# Patient Record
Sex: MALE | Race: ASIAN | NOT HISPANIC OR LATINO | URBAN - METROPOLITAN AREA
[De-identification: names, ages, dates, MRNs, and addresses within clinical notes are randomized per-mention and may not be internally consistent; named-entity substitution may affect disease eponyms.]

---

## 2021-06-25 ENCOUNTER — INPATIENT (INPATIENT)
Facility: HOSPITAL | Age: 66
LOS: 11 days | Discharge: HOME HEALTH | DRG: 167 | End: 2021-07-07
Attending: INTERNAL MEDICINE | Admitting: INTERNAL MEDICINE
Payer: MEDICAID

## 2021-06-25 ENCOUNTER — TRANSCRIPTION ENCOUNTER (OUTPATIENT)
Age: 66
End: 2021-06-25

## 2021-06-25 VITALS
SYSTOLIC BLOOD PRESSURE: 146 MMHG | HEART RATE: 95 BPM | WEIGHT: 171.96 LBS | DIASTOLIC BLOOD PRESSURE: 99 MMHG | RESPIRATION RATE: 22 BRPM | HEIGHT: 62.99 IN | TEMPERATURE: 98 F | OXYGEN SATURATION: 97 %

## 2021-06-25 DIAGNOSIS — I10 ESSENTIAL (PRIMARY) HYPERTENSION: ICD-10-CM

## 2021-06-25 DIAGNOSIS — J90 PLEURAL EFFUSION, NOT ELSEWHERE CLASSIFIED: ICD-10-CM

## 2021-06-25 DIAGNOSIS — E78.5 HYPERLIPIDEMIA, UNSPECIFIED: ICD-10-CM

## 2021-06-25 DIAGNOSIS — Z29.9 ENCOUNTER FOR PROPHYLACTIC MEASURES, UNSPECIFIED: ICD-10-CM

## 2021-06-25 DIAGNOSIS — R06.00 DYSPNEA, UNSPECIFIED: ICD-10-CM

## 2021-06-25 LAB
ALBUMIN SERPL ELPH-MCNC: 2.7 G/DL — LOW (ref 3.5–5)
ALP SERPL-CCNC: 118 U/L — SIGNIFICANT CHANGE UP (ref 40–120)
ALT FLD-CCNC: 41 U/L DA — SIGNIFICANT CHANGE UP (ref 10–60)
ANION GAP SERPL CALC-SCNC: 8 MMOL/L — SIGNIFICANT CHANGE UP (ref 5–17)
APTT BLD: 32 SEC — SIGNIFICANT CHANGE UP (ref 27.5–35.5)
AST SERPL-CCNC: 23 U/L — SIGNIFICANT CHANGE UP (ref 10–40)
BASOPHILS # BLD AUTO: 0.06 K/UL — SIGNIFICANT CHANGE UP (ref 0–0.2)
BASOPHILS NFR BLD AUTO: 0.6 % — SIGNIFICANT CHANGE UP (ref 0–2)
BILIRUB SERPL-MCNC: 0.4 MG/DL — SIGNIFICANT CHANGE UP (ref 0.2–1.2)
BUN SERPL-MCNC: 10 MG/DL — SIGNIFICANT CHANGE UP (ref 7–18)
CALCIUM SERPL-MCNC: 8.5 MG/DL — SIGNIFICANT CHANGE UP (ref 8.4–10.5)
CHLORIDE SERPL-SCNC: 106 MMOL/L — SIGNIFICANT CHANGE UP (ref 96–108)
CO2 SERPL-SCNC: 23 MMOL/L — SIGNIFICANT CHANGE UP (ref 22–31)
CREAT SERPL-MCNC: 0.98 MG/DL — SIGNIFICANT CHANGE UP (ref 0.5–1.3)
EOSINOPHIL # BLD AUTO: 0.19 K/UL — SIGNIFICANT CHANGE UP (ref 0–0.5)
EOSINOPHIL NFR BLD AUTO: 2 % — SIGNIFICANT CHANGE UP (ref 0–6)
GLUCOSE SERPL-MCNC: 98 MG/DL — SIGNIFICANT CHANGE UP (ref 70–99)
HCT VFR BLD CALC: 43.8 % — SIGNIFICANT CHANGE UP (ref 39–50)
HGB BLD-MCNC: 14.6 G/DL — SIGNIFICANT CHANGE UP (ref 13–17)
IMM GRANULOCYTES NFR BLD AUTO: 0.4 % — SIGNIFICANT CHANGE UP (ref 0–1.5)
INR BLD: 1.33 RATIO — HIGH (ref 0.88–1.16)
LYMPHOCYTES # BLD AUTO: 0.68 K/UL — LOW (ref 1–3.3)
LYMPHOCYTES # BLD AUTO: 7.3 % — LOW (ref 13–44)
MCHC RBC-ENTMCNC: 30 PG — SIGNIFICANT CHANGE UP (ref 27–34)
MCHC RBC-ENTMCNC: 33.3 GM/DL — SIGNIFICANT CHANGE UP (ref 32–36)
MCV RBC AUTO: 89.9 FL — SIGNIFICANT CHANGE UP (ref 80–100)
MONOCYTES # BLD AUTO: 1.46 K/UL — HIGH (ref 0–0.9)
MONOCYTES NFR BLD AUTO: 15.6 % — HIGH (ref 2–14)
NEUTROPHILS # BLD AUTO: 6.91 K/UL — SIGNIFICANT CHANGE UP (ref 1.8–7.4)
NEUTROPHILS NFR BLD AUTO: 74.1 % — SIGNIFICANT CHANGE UP (ref 43–77)
NRBC # BLD: 0 /100 WBCS — SIGNIFICANT CHANGE UP (ref 0–0)
PLATELET # BLD AUTO: 453 K/UL — HIGH (ref 150–400)
POTASSIUM SERPL-MCNC: 4.6 MMOL/L — SIGNIFICANT CHANGE UP (ref 3.5–5.3)
POTASSIUM SERPL-SCNC: 4.6 MMOL/L — SIGNIFICANT CHANGE UP (ref 3.5–5.3)
PROT SERPL-MCNC: 7.6 G/DL — SIGNIFICANT CHANGE UP (ref 6–8.3)
PROTHROM AB SERPL-ACNC: 15.6 SEC — HIGH (ref 10.6–13.6)
RBC # BLD: 4.87 M/UL — SIGNIFICANT CHANGE UP (ref 4.2–5.8)
RBC # FLD: 13.4 % — SIGNIFICANT CHANGE UP (ref 10.3–14.5)
SARS-COV-2 RNA SPEC QL NAA+PROBE: SIGNIFICANT CHANGE UP
SODIUM SERPL-SCNC: 137 MMOL/L — SIGNIFICANT CHANGE UP (ref 135–145)
TROPONIN I SERPL-MCNC: <0.015 NG/ML — SIGNIFICANT CHANGE UP (ref 0–0.04)
WBC # BLD: 9.34 K/UL — SIGNIFICANT CHANGE UP (ref 3.8–10.5)
WBC # FLD AUTO: 9.34 K/UL — SIGNIFICANT CHANGE UP (ref 3.8–10.5)

## 2021-06-25 PROCEDURE — 99284 EMERGENCY DEPT VISIT MOD MDM: CPT

## 2021-06-25 PROCEDURE — G1004: CPT

## 2021-06-25 PROCEDURE — 93010 ELECTROCARDIOGRAM REPORT: CPT

## 2021-06-25 PROCEDURE — 71260 CT THORAX DX C+: CPT | Mod: 26,MG

## 2021-06-25 RX ORDER — AZITHROMYCIN 500 MG/1
500 TABLET, FILM COATED ORAL ONCE
Refills: 0 | Status: COMPLETED | OUTPATIENT
Start: 2021-06-25 | End: 2021-06-25

## 2021-06-25 RX ORDER — AZITHROMYCIN 500 MG/1
500 TABLET, FILM COATED ORAL EVERY 24 HOURS
Refills: 0 | Status: DISCONTINUED | OUTPATIENT
Start: 2021-06-26 | End: 2021-06-28

## 2021-06-25 RX ORDER — CEFTRIAXONE 500 MG/1
1000 INJECTION, POWDER, FOR SOLUTION INTRAMUSCULAR; INTRAVENOUS EVERY 24 HOURS
Refills: 0 | Status: DISCONTINUED | OUTPATIENT
Start: 2021-06-25 | End: 2021-06-28

## 2021-06-25 RX ORDER — AZITHROMYCIN 500 MG/1
TABLET, FILM COATED ORAL
Refills: 0 | Status: DISCONTINUED | OUTPATIENT
Start: 2021-06-25 | End: 2021-06-28

## 2021-06-25 RX ORDER — OXYCODONE AND ACETAMINOPHEN 5; 325 MG/1; MG/1
1 TABLET ORAL EVERY 8 HOURS
Refills: 0 | Status: DISCONTINUED | OUTPATIENT
Start: 2021-06-25 | End: 2021-06-28

## 2021-06-25 RX ORDER — ACETAMINOPHEN 500 MG
650 TABLET ORAL EVERY 6 HOURS
Refills: 0 | Status: DISCONTINUED | OUTPATIENT
Start: 2021-06-25 | End: 2021-06-28

## 2021-06-25 RX ORDER — TRAMADOL HYDROCHLORIDE 50 MG/1
50 TABLET ORAL EVERY 8 HOURS
Refills: 0 | Status: DISCONTINUED | OUTPATIENT
Start: 2021-06-25 | End: 2021-06-27

## 2021-06-25 RX ORDER — HEPARIN SODIUM 5000 [USP'U]/ML
5000 INJECTION INTRAVENOUS; SUBCUTANEOUS EVERY 8 HOURS
Refills: 0 | Status: DISCONTINUED | OUTPATIENT
Start: 2021-06-25 | End: 2021-06-27

## 2021-06-25 RX ORDER — ALBUTEROL 90 UG/1
2 AEROSOL, METERED ORAL EVERY 6 HOURS
Refills: 0 | Status: DISCONTINUED | OUTPATIENT
Start: 2021-06-25 | End: 2021-06-28

## 2021-06-25 RX ADMIN — HEPARIN SODIUM 5000 UNIT(S): 5000 INJECTION INTRAVENOUS; SUBCUTANEOUS at 22:01

## 2021-06-25 RX ADMIN — AZITHROMYCIN 500 MILLIGRAM(S): 500 TABLET, FILM COATED ORAL at 20:00

## 2021-06-25 RX ADMIN — CEFTRIAXONE 100 MILLIGRAM(S): 500 INJECTION, POWDER, FOR SOLUTION INTRAMUSCULAR; INTRAVENOUS at 19:58

## 2021-06-25 NOTE — DISCHARGE NOTE PROVIDER - PROVIDER TOKENS
PROVIDER:[TOKEN:[[id number]],FOLLOWUP:[2 weeks]] PROVIDER:[TOKEN:[[id number]],FOLLOWUP:[2 weeks]],PROVIDER:[TOKEN:[[id number]],FOLLOWUP:[2 weeks]],PROVIDER:[TOKEN:[[id number]],FOLLOWUP:[Routine]]

## 2021-06-25 NOTE — H&P ADULT - HISTORY OF PRESENT ILLNESS
65 y/o male with a PMH of HTN, HLD  presents to the ED with c/o SOB, cough and chest tightness for the past 2 weeks. Patient went to urgent care today and was referred to the ED for further evaluation. Pt states he started having flu like symptoms with a runny nose and low grade fever of 100.5-101 2 weeks ago. He then started developing a cough and body aches with chest tightness. The chest tightness is more on the left side, 7/10 in intensity and radiating to the back and stomach. He has been having night sweats and chills for the past 8 days. Cough is associated with yellow coloured phlegm. SOB is worse when lying down. Pt denies any known Tuberculosis exposure. Pt moved to the  in 2003 from [place]. He smokes 1pack in 4 days and has been smoking for 40 years.   Patient denies fever, nausea, vomiting, or any other complaints. NKDA

## 2021-06-25 NOTE — DISCHARGE NOTE PROVIDER - HOSPITAL COURSE
67 y/o male with a PMH of HTN, HLD , active smoker presents to the ED with c/o SOB, cough and chest tightness for the past 2 weeks.  CT: Large left pleural effusion with associated atelectasis of the left lung as described above. Associated right shift of the mediastinum.  Mildly enlarged subcarinal lymph node. Ascending aortic aneurysm measuring 4.1 cm in diameter. 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 Mild COPD.  Sclerotic lesion in the left posterior fifth rib. Small sclerotic lesion in T2 vertebra. Pt was started on start on Rocephin and Azithromycin empirically. QuantiFeron for TB was sent to lab. Pulmonologist, Dr Mendez & hem/Onc: Dr López was consulted.      >>>>>>>>>>>>>>>>>>>>>>>>>>>>>>>>>>>>>>>>>>>INCOMPLETE>>>>>>>>>>>>>>>>>>>>>>>>>>>>>>>>>>>>>>>>   67 y/o male with a PMH of HTN, HLD, smoker  presents to the ED with c/o SOB, cough and chest tightness for the past 2 weeks. Also reported  night sweats and chills for one week   CT Chest w/ IV Cont Large left pleural effusion with associated atelectasis of the left lung as described above. Associated right shift of the mediastinum. Mildly enlarged subcarinal lymph node.  Admitted for pleural effusion  r/o  malignancy and TB   Pulm, heme /onc and thoracic surgery were consulted.   Pt underwent Lt VATS with pleural biopsy and pleurx cath was inserted on 6.28. Frozen biopsy was performed of lesion that appeared concerning for malignancy, as per pathology suspicious of malignancy however tissue was to edematous and necrotic to say for certain.     Heme /onc consulted for large pleural effusion which highly suspicious for malignancy, tumor markers tested and CEA was elevated which concerning for lung cancer.   Pulm consulted, tested AFB to r/o TB,  quantiferon was negative, placed pt on airborne isolation, 1st AFB negative- [id number]    incomplete 6/30      67 y/o male with a PMH of HTN, HLD, smoker  presents to the ED with c/o SOB, cough and chest tightness for the past 2 weeks. Also reported  night sweats and chills for one week   CT Chest w/ IV Cont Large left pleural effusion with associated atelectasis of the left lung as described above. Associated right shift of the mediastinum. Mildly enlarged subcarinal lymph node.  Admitted for pleural effusion  r/o  malignancy and TB   Pulm, heme /onc and thoracic surgery were consulted.   Pt underwent Lt VATS with pleural biopsy and pleurx cath was inserted on 6.28. Frozen biopsy was performed of lesion that appeared concerning for malignancy, as per pathology suspicious of malignancy however tissue was to edematous and necrotic to say for certain.     Heme /onc consulted for large pleural effusion which highly suspicious for malignancy, tumor markers tested and CEA was elevated which concerning for lung cancer.   Pulm consulted, tested AFB to r/o TB,  quantiferon was negative, placed pt on airborne isolation,  AFB negative x 2 times 3rd sent 7/2     incomplete 6/30      67 y/o male with a PMH of HTN, HLD, smoker  presents to the ED with c/o SOB, cough and chest tightness for the past 2 weeks. Also reported  night sweats and chills for one week   CT Chest w/ IV Cont Large left pleural effusion with associated atelectasis of the left lung as described above. Associated right shift of the mediastinum. Mildly enlarged subcarinal lymph node.  Admitted for pleural effusion  r/o  malignancy and TB.  Pulm, heme /onc and thoracic surgery were consulted.   Pt underwent Lt VATS with pleural biopsy and pleurx cath was inserted on 6.28. Frozen biopsy was performed of lesion that appeared concerning for malignancy, as per pathology suspicious of malignancy however tissue was to edematous and necrotic to say for certain.  Heme /onc consulted for large pleural effusion which highly suspicious for malignancy, tumor markers tested and CEA was elevated which concerning for lung cancer.   Pulmonary followed, tested AFB to r/o TB which are negative x 3 (6/30, 7/1, 7/2),  quantiferon was negative, monitored pt on airborne isolation,  pt denies cough, night sweats, fever or chest pain. Saturating well on room air.  Reinforced the importance of smoking cessation. pt agrees to continue nicotine patch. Left side Pleurx catheter remains intact. Thoracic surgery recommended to set VNS for drain  three times per week at home.    Patient is medically optimized for discharge home with outpatient follow up, cleared by Thoracic surgery and pulmonary. Discussed with attending physician.   Please note this is a brief summary and refer to medical records/daily progress notes for completed hospital course.

## 2021-06-25 NOTE — DISCHARGE NOTE PROVIDER - NSDCMRMEDTOKEN_GEN_ALL_CORE_FT
albuterol 90 mcg/inh inhalation aerosol: 2 puff(s) inhaled every 6 hours, As Needed -Shortness of Breath and/or Wheezing - for shortness of breath and/or wheezing   amLODIPine 10 mg oral tablet: 1 tab(s) orally once a day  guaiFENesin 100 mg/5 mL oral liquid: 5 milliliter(s) orally every 6 hours, As needed, Cough  nicotine 14 mg/24 hr transdermal film, extended release: 1 patch transdermal once a day   Plavix 75 mg oral tablet: 1 tab(s) orally once a day  Zocor 20 mg oral tablet: 1 tab(s) orally once a day (at bedtime)

## 2021-06-25 NOTE — ED PROVIDER NOTE - OBJECTIVE STATEMENT
mandarin  ID: [id number].   66 y.o male with a history of HLD and being an active smoker (1x pack in last 4 days) presents to the ED with c/o SOB and cough for the past 4 days. Patient went to urgent care today and was referred to the ED for further evaluation. Patient in the ED is afebrile. Patient denies fever, nausea, vomiting, or any other complaints. NKDA

## 2021-06-25 NOTE — H&P ADULT - NSHPSOCIALHISTORY_GEN_ALL_CORE
Fax received from [employer] requesting completion of refill/reorder form for patients insulin.  There is no product or dose on the form.  Call to pt for clarification regarding her request  Her last refill in the chart is noted from 8/20/2020 as Lispro/Humalog.  Left message in detail asking for a return call for clarification.  Message also sent in the portal.  Form is placed in folder for signature.           No ETOH. Smokes 1 pack in 4 days. Smoking for 40 years

## 2021-06-25 NOTE — DISCHARGE NOTE PROVIDER - NSDCFUADDINST_GEN_ALL_CORE_FT
Drain Pleurx catheter three times per day.   Do not drain over 300ml each time.  Drain Pleurx catheter three times per week.  Do not drain over 1L each time.

## 2021-06-25 NOTE — DISCHARGE NOTE PROVIDER - CARE PROVIDERS DIRECT ADDRESSES
,DirectAddress_Unknown ,DirectAddress_Unknown,[email or web address],DirectAddress_Unknown

## 2021-06-25 NOTE — H&P ADULT - PROBLEM SELECTOR PLAN 1
Pt coming in with dyspnea, pleuritic chest pain and cough  Active smoker  Decreased breath sounds on left side  Malignancy vs PNA   CT: Large left pleural effusion with associated atelectasis of the left lung as described above. Associated right shift of the mediastinum. Sclerotic lesion in the left posterior fifth rib. Small sclerotic lesion in T2 vertebra.  Will start on Rocephin and Azithro empirically   Pt will likely need a thoracentesis   Dr. Mendez consulted by ED  Dr. López consulted Pt coming in with dyspnea, pleuritic chest pain and cough  Active smoker  Decreased breath sounds on left side  Malignancy vs PNA   CT: Large left pleural effusion with associated atelectasis of the left lung as described above. Associated right shift of the mediastinum. Sclerotic lesion in the left posterior fifth rib. Small sclerotic lesion in T2 vertebra.  Will start on Rocephin and Azithro empirically   Pt will likely need a thoracentesis   Follow QuantiFeron for TB   Dr. Mendez consulted by ED  Dr. López consulted

## 2021-06-25 NOTE — DISCHARGE NOTE PROVIDER - NSFOLLOWUPCLINICS_GEN_ALL_ED_FT
[facility]  Pulmonary Medicine  [address] [place] [address]  Phone: [phone]  Fax: [phone]  Follow Up Time: 1 week

## 2021-06-25 NOTE — H&P ADULT - NSHPPHYSICALEXAM_GEN_ALL_CORE
LOS:     VITALS:   T(C): 36.4 (06-25-21 @ 16:32), Max: 36.5 (06-25-21 @ 11:27)  HR: 101 (06-25-21 @ 16:32) (95 - 101)  BP: 153/99 (06-25-21 @ 16:32) (146/99 - 153/99)  RR: 16 (06-25-21 @ 16:32) (16 - 22)  SpO2: 99% (06-25-21 @ 16:32) (97% - 99%)    GENERAL: sitting in bed comfortably   HEAD:  Atraumatic, Normocephalic  EYES: EOMI, PERRLA, Right eye pterygium;  ENT: Moist mucous membranes  NECK: Supple, No JVD  CHEST/LUNG: decreased breath sound on the left   HEART: Regular rate and rhythm; No murmurs, rubs, or gallops  ABDOMEN : Mild distension, BSx4; Soft, nontender  EXTREMITIES:  2+ Peripheral Pulses, brisk capillary refill. No clubbing, cyanosis, or edema  NERVOUS SYSTEM:  A&Ox3, no focal deficits   SKIN: No rashes or lesions

## 2021-06-25 NOTE — ED PROVIDER NOTE - CLINICAL SUMMARY MEDICAL DECISION MAKING FREE TEXT BOX
66 y.o male presents with SOB that has been worsening in the past 4 days. Will do labs, CT chest, and reassess.

## 2021-06-25 NOTE — H&P ADULT - NSHPREVIEWOFSYSTEMS_GEN_ALL_CORE
REVIEW OF SYSTEMS:    CONSTITUTIONAL: weakness and chills  EYES/ENT:   No vertigo or throat pain   NECK: No pain or stiffness  RESPIRATORY: shortness of breath and cough  CARDIOVASCULAR: chest pain, no palpitations  GASTROINTESTINAL: left sided abdominal pain. No nausea, vomiting, or hematemesis; No diarrhea or constipation. No melena or hematochezia.  GENITOURINARY: No dysuria, frequency or hematuria  NEUROLOGICAL: No numbness or weakness  SKIN: No itching, rashes

## 2021-06-25 NOTE — ED PROVIDER NOTE - PROGRESS NOTE DETAILS
MAVIS: patient signed out to me by Dr. Epps. SOB, cough. Awaiting results of CT chest. Plan is to admit. Patient is mildly tachypneic, though satting well on room air and VS stable. I endorsed patient to Dr. Spicer, who requested Andrea for pulmonary. I spoke to Dr. Mendez, described clinical situation and mediastinal shift. He will come see patient.

## 2021-06-25 NOTE — DISCHARGE NOTE PROVIDER - DETAILS OF MALNUTRITION DIAGNOSIS/DIAGNOSES
This patient has been assessed with a concern for Malnutrition and was treated during this hospitalization for the following Nutrition diagnosis/diagnoses:     -  07/02/2021: Moderate protein-calorie malnutrition

## 2021-06-25 NOTE — H&P ADULT - PROBLEM SELECTOR PLAN 4
IMPROVE VTE Individual Risk Assessment  RISK                                                                Points  [  ] Previous VTE                                                  3  [  ] Thrombophilia                                               2  [  ] Lower limb paralysis                                      2        (unable to hold up >15 seconds)    [  ] Current Cancer                                              2         (within 6 months)  [x  ] Immobilization > 24 hrs                                1  [  ] ICU/CCU stay > 24 hours                              1  [x  ] Age > 60                                                      1  IMPROVE VTE Score _________2, -- for DVT proph    Will start on heparin SubQ

## 2021-06-25 NOTE — H&P ADULT - ASSESSMENT
65 y/o male with a PMH of HTN, HLD  presents to the ED with c/o SOB, cough and chest tightness for the past 2 weeks.    CT: Large left pleural effusion with associated atelectasis of the left lung as described above. Associated right shift of the mediastinum.    Mildly enlarged subcarinal lymph node.    Ascending aortic aneurysm measuring 4.1 cm in diameter. 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    Mild COPD.    Sclerotic lesion in the left posterior fifth rib. Small sclerotic lesion in T2 vertebra. If clinically indicated, bone scan may be pursued for further evaluation.

## 2021-06-25 NOTE — CONSULT NOTE ADULT - SUBJECTIVE AND OBJECTIVE BOX
Time of visit:    CHIEF COMPLAINT: Patient is a 66y old  Male who presents with a chief complaint of Cough and Dyspnea (25 Jun 2021 18:41)      HPI:  67 y/o male with a PMH of HTN, HLD  presents to the ED with c/o SOB, cough and chest tightness for the past 2 weeks. Patient went to urgent care today and was referred to the ED for further evaluation. Pt states he started having flu like symptoms with a runny nose and low grade fever of 100.5-101 2 weeks ago. He then started developing a cough and body aches with chest tightness. The chest tightness is more on the left side, 7/10 in intensity and radiating to the back and stomach. He has been having night sweats and chills for the past 8 days. Cough is associated with yellow coloured phlegm. SOB is worse when lying down. Pt denies any known Tuberculosis exposure. Pt moved to the  in 2003 from [place]. He smokes 1pack in 4 days and has been smoking for 40 years.   Patient denies fever, nausea, vomiting, or any other complaints. NKDA (25 Jun 2021 18:41)   Patient seen and examined.     PAST MEDICAL & SURGICAL HISTORY:  HLD (hyperlipidemia)    HTN (hypertension)        Allergies    No Known Allergies    Intolerances        MEDICATIONS  (STANDING):  azithromycin  IVPB      cefTRIAXone   IVPB 1000 milliGRAM(s) IV Intermittent every 24 hours  heparin   Injectable 5000 Unit(s) SubCutaneous every 8 hours      MEDICATIONS  (PRN):  acetaminophen    Suspension .. 650 milliGRAM(s) Oral every 6 hours PRN Mild Pain (1 - 3)  ALBUTerol    90 MICROgram(s) HFA Inhaler 2 Puff(s) Inhalation every 6 hours PRN Shortness of Breath and/or Wheezing  oxycodone    5 mG/acetaminophen 325 mG 1 Tablet(s) Oral every 8 hours PRN Moderate Pain (4 - 6)  traMADol 50 milliGRAM(s) Oral every 8 hours PRN Severe Pain (7 - 10)   Medications up to date at time of exam.    Medications up to date at time of exam.    FAMILY HISTORY:      SOCIAL HISTORY  Smoking History: [   ] smoking/smoke exposure, [   ] former smoker  Living Condition: [   ] apartment, [   ] private house  Work History:   Travel History: denies recent travel  Illicit Substance Use: denies  Alcohol Use: denies    REVIEW OF SYSTEMS:    CONSTITUTIONAL:  denies fevers, chills, sweats, weight loss    HEENT:  denies diplopia or blurred vision, sore throat or runny nose.    CARDIOVASCULAR:  denies pressure, squeezing, tightness, or heaviness about the chest; no palpitations.    RESPIRATORY:  denies SOB, cough, OSBORN, wheezing.    GASTROINTESTINAL:  denies abdominal pain, nausea, vomiting or diarrhea.    GENITOURINARY: denies dysuria, frequency or urgency.    NEUROLOGIC:  denies numbness, tingling, seizures or weakness.    PSYCHIATRIC:  denies disorder of thought or mood.    MSK: denies swelling, redness      PHYSICAL EXAMINATION:    GENERAL: The patient is a well-developed, well-nourished, in no apparent distress.     Vital Signs Last 24 Hrs  T(C): 36.4 (25 Jun 2021 16:32), Max: 36.5 (25 Jun 2021 11:27)  T(F): 97.6 (25 Jun 2021 16:32), Max: 97.7 (25 Jun 2021 11:27)  HR: 101 (25 Jun 2021 16:32) (95 - 101)  BP: 153/99 (25 Jun 2021 16:32) (146/99 - 153/99)  BP(mean): --  RR: 16 (25 Jun 2021 16:32) (16 - 22)  SpO2: 99% (25 Jun 2021 16:32) (97% - 99%)   (if applicable)    Chest Tube (if applicable)    HEENT: Head is normocephalic and atraumatic. Extraocular muscles are intact. Mucous membranes are moist.     NECK: Supple, no palpable adenopathy.    LUNGS: Clear to auscultation, no wheezing, rales, or rhonchi.    HEART: Regular rate and rhythm without murmur.    ABDOMEN: Soft, nontender, and nondistended.  No hepatosplenomegaly is noted.    RENAL: No difficulty voiding, no pelvic pain    EXTREMITIES: Without any cyanosis, clubbing, rash, lesions or edema.    NEUROLOGIC: Awake, alert, oriented, grossly intact    SKIN: Warm, dry, good turgor.      LABS:                        14.6   9.34  )-----------( 453      ( 25 Jun 2021 13:56 )             43.8     06-25    137  |  106  |  10  ----------------------------<  98  4.6   |  23  |  0.98    Ca    8.5      25 Jun 2021 13:56    TPro  7.6  /  Alb  2.7<L>  /  TBili  0.4  /  DBili  x   /  AST  23  /  ALT  41  /  AlkPhos  118  06-25          CARDIAC MARKERS ( 25 Jun 2021 13:56 )  <0.015 ng/mL / x     / x     / x     / x                    MICROBIOLOGY: (if applicable)    RADIOLOGY & ADDITIONAL STUDIES:  EKG:   CXR:  ECHO:    IMPRESSION: 66y Male PAST MEDICAL & SURGICAL HISTORY:  HLD (hyperlipidemia)    HTN (hypertension)     p/w                   RECOMMENDATIONS:   Time of visit:    CHIEF COMPLAINT: Patient is a 66y old  Male who presents with a chief complaint of Cough and Dyspnea (25 Jun 2021 18:41)      HPI:  67 y/o male with a PMH of HTN, HLD  presents to the ED with c/o SOB, cough and chest tightness for the past 2 weeks. Patient went to urgent care today and was referred to the ED for further evaluation. Pt states he started having flu like symptoms with a runny nose and low grade fever of 100.5-101 2 weeks ago. He then started developing a cough and body aches with chest tightness. The chest tightness is more on the left side, 7/10 in intensity and radiating to the back and stomach. He has been having night sweats and chills for the past 8 days. Cough is associated with yellow coloured phlegm. SOB is worse when lying down. Pt denies any known Tuberculosis exposure. Pt moved to the  in 2003 from [place]. He smokes 1pack in 4 days and has been smoking for 40 years.   Patient denies fever, nausea, vomiting, or any other complaints. NKDA (25 Jun 2021 18:41)   Patient seen and examined.     PAST MEDICAL & SURGICAL HISTORY:  HLD (hyperlipidemia)    HTN (hypertension)        Allergies    No Known Allergies    Intolerances        MEDICATIONS  (STANDING):  azithromycin  IVPB      cefTRIAXone   IVPB 1000 milliGRAM(s) IV Intermittent every 24 hours  heparin   Injectable 5000 Unit(s) SubCutaneous every 8 hours      MEDICATIONS  (PRN):  acetaminophen    Suspension .. 650 milliGRAM(s) Oral every 6 hours PRN Mild Pain (1 - 3)  ALBUTerol    90 MICROgram(s) HFA Inhaler 2 Puff(s) Inhalation every 6 hours PRN Shortness of Breath and/or Wheezing  oxycodone    5 mG/acetaminophen 325 mG 1 Tablet(s) Oral every 8 hours PRN Moderate Pain (4 - 6)  traMADol 50 milliGRAM(s) Oral every 8 hours PRN Severe Pain (7 - 10)   Medications up to date at time of exam.    Medications up to date at time of exam.    FAMILY HISTORY:      SOCIAL HISTORY  Smoking History: [ x  ] smoking/smoke exposure, 1/2 PPD  Living Condition: [   ] apartment, [   ] private house  Work History:   Travel History: denies recent travel  Illicit Substance Use: denies  Alcohol Use: denies    REVIEW OF SYSTEMS:    CONSTITUTIONAL:  denies fevers, chills, sweats, weight loss    HEENT:  denies diplopia or blurred vision, sore throat or runny nose.    CARDIOVASCULAR:  denies pressure, squeezing, tightness, or heaviness about the chest; no palpitations.    RESPIRATORY:  denies SOB, cough, OSBORN, wheezing.    GASTROINTESTINAL:  denies abdominal pain, nausea, vomiting or diarrhea.    GENITOURINARY: denies dysuria, frequency or urgency.    NEUROLOGIC:  denies numbness, tingling, seizures or weakness.    PSYCHIATRIC:  denies disorder of thought or mood.    MSK: denies swelling, redness      PHYSICAL EXAMINATION:    GENERAL: The patient is a well-developed, well-nourished, in no apparent distress.     Vital Signs Last 24 Hrs  T(C): 36.4 (25 Jun 2021 16:32), Max: 36.5 (25 Jun 2021 11:27)  T(F): 97.6 (25 Jun 2021 16:32), Max: 97.7 (25 Jun 2021 11:27)  HR: 101 (25 Jun 2021 16:32) (95 - 101)  BP: 153/99 (25 Jun 2021 16:32) (146/99 - 153/99)  BP(mean): --  RR: 16 (25 Jun 2021 16:32) (16 - 22)  SpO2: 99% (25 Jun 2021 16:32) (97% - 99%)   (if applicable)    Chest Tube (if applicable)    HEENT: Head is normocephalic and atraumatic. Extraocular muscles are intact. Mucous membranes are moist.     NECK: Supple, no palpable adenopathy.    LUNGS: Left hemithorax dullness , fair right breath sounds     HEART: Regular rate and rhythm without murmur.    ABDOMEN: Soft, nontender, and nondistended.  No hepatosplenomegaly is noted.    RENAL: No difficulty voiding, no pelvic pain    EXTREMITIES: Without any cyanosis, clubbing, rash, lesions or edema.    NEUROLOGIC: Awake, alert, oriented, grossly intact    SKIN: Warm, dry, good turgor.      LABS:                        14.6   9.34  )-----------( 453      ( 25 Jun 2021 13:56 )             43.8     06-25    137  |  106  |  10  ----------------------------<  98  4.6   |  23  |  0.98    Ca    8.5      25 Jun 2021 13:56    TPro  7.6  /  Alb  2.7<L>  /  TBili  0.4  /  DBili  x   /  AST  23  /  ALT  41  /  AlkPhos  118  06-25          CARDIAC MARKERS ( 25 Jun 2021 13:56 )  <0.015 ng/mL / x     / x     / x     / x                    MICROBIOLOGY: (if applicable)    RADIOLOGY & ADDITIONAL STUDIES:  EKG:   CTPA:< from: CT Chest w/ IV Cont (06.25.21 @ 15:03) >    EXAM:  CT CHEST IC                            PROCEDURE DATE:  06/25/2021          INTERPRETATION:  CLINICAL INFORMATION: Abnormal chest x-ray. Large left pleural effusion.    COMPARISON: None.    CONTRAST/COMPLICATIONS:  IV Contrast: Omnipaque 350 40 cc administered   60 cc discarded  Oral Contrast: NONE  Complications: None reported at time of study completion    PROCEDURE:  CT of the Chest was performed.  Sagittal and coronal reformats were performed.    FINDINGS: Respiratory motion artifact limits evaluation.    LUNGS AND AIRWAYS: Patent central airways.  Small calcified granuloma in the right lung. Near-complete compressive atelectasis of the left lower lobe. Partial compressive atelectasis of the left upper lobe. Complete compressiveatelectasis of the lingula. Mild emphysematous changes in both lungs, compatible with COPD.  PLEURA: Large left pleural effusion. No evidence for pneumothorax.  MEDIASTINUM AND AKIRA: Mildly enlarged 1.1 cm subcarinal lymph node. Right shift of the mediastinum due to the large left pleural effusion.  VESSELS: Ascending aortic aneurysm measuring 4.1 cm in diameter. 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 No evidence for aortic dissection. Small aortic calcifications.  HEART: Heart size is normal. No pericardial effusion.  CHEST WALL AND LOWER NECK: Within normal limits.  VISUALIZED UPPER ABDOMEN: Small hypodense lesions in the liver, too small to characterize. Colonic diverticulosis.  BONES: Sclerotic lesion in the left posterior fifth rib. Small sclerotic lesion in T2 vertebra. If clinically indicated, bone scan may be pursued for further evaluation.    IMPRESSION:  Large left pleural effusion with associated atelectasis of the left lung as described above. Associated rightshift of the mediastinum.    Mildly enlarged subcarinal lymph node.    Ascending aortic aneurysm measuring 4.1 cm in diameter. 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    Mild COPD.    Sclerotic lesion in the left posterior fifth rib. Small sclerotic lesion in T2 vertebra. If clinically indicated, bone scan may be pursued for further evaluation.            MANDO HERNANDEZ MD; Attending Radiologist  This document has been electronically signed. Jun 25 2021  3:35PM    < end of copied text >    ECHO:    IMPRESSION: 66y Male PAST MEDICAL & SURGICAL HISTORY:  HLD (hyperlipidemia)    HTN (hypertension)     p/w           IMP: This is a66 yr old  man , active   smoker , HTN, HLD presented with dyspnea and cough due to massive left pleural effusion completely  filling  left hemithorax with out contralateral mediastinal shift . There is suspicion of underlying mass.  Pat is not in resp distress .       Plan     -air borne isolation   -sputum for AFB x 3  -O2 supp to maintain sat >90%  -Thoracic eval for VATS   -advise to stop smoking   -nicotine patch   -dvt/gi prophy  -PSA, CEA  dvt/ gi prophy

## 2021-06-25 NOTE — DISCHARGE NOTE PROVIDER - NSDCCPCAREPLAN_GEN_ALL_CORE_FT
PRINCIPAL DISCHARGE DIAGNOSIS  Diagnosis: Pleural effusion  Assessment and Plan of Treatment:       SECONDARY DISCHARGE DIAGNOSES  Diagnosis: Dyspnea, unspecified type  Assessment and Plan of Treatment:      PRINCIPAL DISCHARGE DIAGNOSIS  Diagnosis: Pleural effusion  Assessment and Plan of Treatment: You presented with dyspnea nad cough and found to have fluis in your lung  Thoracic surgeon followed you and you underwent Left  chest tube, sent biopsy for pleural area   biopsy results showed -xxxxxxxxxxxxxxxxxx  follow up with hematology/ oncology as out=patient and pulmonology         SECONDARY DISCHARGE DIAGNOSES  Diagnosis: Dyspnea, unspecified type  Assessment and Plan of Treatment:      PRINCIPAL DISCHARGE DIAGNOSIS  Diagnosis: Pleural effusion  Assessment and Plan of Treatment: You presented with dyspnea nad cough and found to have fluis in your lung  Thoracic surgeon followed you and you underwent Left  chest tube, sent biopsy for pleural area   biopsy results showed -xxxxxxxxxxxxxxxxxx  follow up with hematology/ oncology as out=patient and pulmonology         SECONDARY DISCHARGE DIAGNOSES  Diagnosis: HTN (hypertension)  Assessment and Plan of Treatment: Low salt diet  Activity as tolerated.  Take all medication as prescribed.  Follow up with your medical doctor for routine blood pressure monitoring at your next visit.  Notify your doctor if you have any of the following symptoms:   Dizziness, Lightheadedness, Blurry vision, Headache, Chest pain, Shortness of breath       PRINCIPAL DISCHARGE DIAGNOSIS  Diagnosis: Pleural effusion  Assessment and Plan of Treatment: You presented with dyspnea and cough and found to have fluids in your lung. CT Chest w/ IV Cont Large left pleural effusion with associated atelectasis of the left lung as described above. Associated rightshift of the mediastinum. Mildly enlarged subcarinal lymph node. treated wtih antibiotic. Heme/oncology, Pulmonary doctor and Thoracic surgeon followed.   You underwent left VATS with pleurx catheter (6/28) then changed to wall suction on same day due to  Left pneumothorax  largely filled in with fluid from CXR. Repeat CXR shows no pneumothorax.   cytology from 6/28 is negative for malignant,  sent biopsy for pleural area. blood TB quantiferon test is negative, sputum cultures are negative for TB. repeat Chest Xray shows no significant change of fluid effusion. continue activity as tolerated. use incentive spirometer.   Chest tube with wall suction was changed to pleurx catheter with cap.   Catheter needs to be drained three times per week with visiting nurse service. Do not drain more than 1L as per thoracic surgery team.   follow up with hematology/ oncology dr. López. Pulmnary dr. Anderson (at [address] [place] [address]), Thoracic surgery dr. Jarrett within 1-2 weeks after discharge. follow up for finalized pathology report.   take cough medicine OTC for cough, albuterol inhaler as needed when you feel shortness of breath, wheezing.   Report to your doctor if you have chest pain, shortness of breath, fever, night sweats or cough, etc.      SECONDARY DISCHARGE DIAGNOSES  Diagnosis: Smoking  Assessment and Plan of Treatment: continue nicotine patch as recommended. Consider smoking cessation.    Diagnosis: HTN (hypertension)  Assessment and Plan of Treatment: Low salt diet  Activity as tolerated.  Take all medication as prescribed.  Follow up with your medical doctor for routine blood pressure monitoring at your next visit.  Notify your doctor if you have any of the following symptoms:   Dizziness, Lightheadedness, Blurry vision, Headache, Chest pain, Shortness of breath      Diagnosis: HLD (hyperlipidemia)  Assessment and Plan of Treatment: Follow up with PCP for treatment goals, continue medication, have liver function testing every 3 months as anti lipid medications can cause liver irritation, eat low fat, low cholesterol meals

## 2021-06-25 NOTE — DISCHARGE NOTE PROVIDER - CARE PROVIDER_API CALL
Justin López  INTERNAL MEDICINE  [address] [place] [address]  Phone: [phone]  Fax: [phone]  Follow Up Time: 2 weeks   Justin López  INTERNAL MEDICINE  [address]  [place] [id number]  Phone: [phone]  Fax: [phone]  Follow Up Time: 2 weeks    Lauryn Jarrett)  Surgery; Thoracic Surgery  [address] [place] [address]  Phone: [phone]  Fax: [phone]  Follow Up Time: 2 weeks    PRANAV WILLIAMSON  Internal Medicine  [address] [place] [address]  Phone: [phone]  Fax: [phone]  Follow Up Time: Routine

## 2021-06-26 LAB
24R-OH-CALCIDIOL SERPL-MCNC: 25.6 NG/ML — LOW (ref 30–80)
ANION GAP SERPL CALC-SCNC: 8 MMOL/L — SIGNIFICANT CHANGE UP (ref 5–17)
BUN SERPL-MCNC: 11 MG/DL — SIGNIFICANT CHANGE UP (ref 7–18)
CALCIUM SERPL-MCNC: 8.2 MG/DL — LOW (ref 8.4–10.5)
CEA SERPL-MCNC: 1.9 NG/ML — SIGNIFICANT CHANGE UP (ref 0–3.8)
CHLORIDE SERPL-SCNC: 105 MMOL/L — SIGNIFICANT CHANGE UP (ref 96–108)
CHOLEST SERPL-MCNC: 173 MG/DL — SIGNIFICANT CHANGE UP
CO2 SERPL-SCNC: 24 MMOL/L — SIGNIFICANT CHANGE UP (ref 22–31)
COVID-19 SPIKE DOMAIN AB INTERP: POSITIVE
COVID-19 SPIKE DOMAIN ANTIBODY RESULT: >250 U/ML — HIGH
CREAT SERPL-MCNC: 0.82 MG/DL — SIGNIFICANT CHANGE UP (ref 0.5–1.3)
GLUCOSE SERPL-MCNC: 87 MG/DL — SIGNIFICANT CHANGE UP (ref 70–99)
HCT VFR BLD CALC: 40.3 % — SIGNIFICANT CHANGE UP (ref 39–50)
HCV AB S/CO SERPL IA: 0.15 S/CO — SIGNIFICANT CHANGE UP (ref 0–0.99)
HCV AB SERPL-IMP: SIGNIFICANT CHANGE UP
HDLC SERPL-MCNC: 29 MG/DL — LOW
HGB BLD-MCNC: 13.6 G/DL — SIGNIFICANT CHANGE UP (ref 13–17)
LIPID PNL WITH DIRECT LDL SERPL: 127 MG/DL — HIGH
MAGNESIUM SERPL-MCNC: 2.5 MG/DL — SIGNIFICANT CHANGE UP (ref 1.6–2.6)
MCHC RBC-ENTMCNC: 30 PG — SIGNIFICANT CHANGE UP (ref 27–34)
MCHC RBC-ENTMCNC: 33.7 GM/DL — SIGNIFICANT CHANGE UP (ref 32–36)
MCV RBC AUTO: 88.8 FL — SIGNIFICANT CHANGE UP (ref 80–100)
NON HDL CHOLESTEROL: 144 MG/DL — HIGH
NRBC # BLD: 0 /100 WBCS — SIGNIFICANT CHANGE UP (ref 0–0)
PHOSPHATE SERPL-MCNC: 2.7 MG/DL — SIGNIFICANT CHANGE UP (ref 2.5–4.5)
PLATELET # BLD AUTO: 233 K/UL — SIGNIFICANT CHANGE UP (ref 150–400)
POTASSIUM SERPL-MCNC: 3.9 MMOL/L — SIGNIFICANT CHANGE UP (ref 3.5–5.3)
POTASSIUM SERPL-SCNC: 3.9 MMOL/L — SIGNIFICANT CHANGE UP (ref 3.5–5.3)
PSA FLD-MCNC: 5.1 NG/ML — HIGH (ref 0–4)
RBC # BLD: 4.54 M/UL — SIGNIFICANT CHANGE UP (ref 4.2–5.8)
RBC # FLD: 13.3 % — SIGNIFICANT CHANGE UP (ref 10.3–14.5)
SARS-COV-2 IGG+IGM SERPL QL IA: >250 U/ML — HIGH
SARS-COV-2 IGG+IGM SERPL QL IA: POSITIVE
SODIUM SERPL-SCNC: 137 MMOL/L — SIGNIFICANT CHANGE UP (ref 135–145)
TRIGL SERPL-MCNC: 85 MG/DL — SIGNIFICANT CHANGE UP
TSH SERPL-MCNC: 0.68 UU/ML — SIGNIFICANT CHANGE UP (ref 0.34–4.82)
WBC # BLD: 9.39 K/UL — SIGNIFICANT CHANGE UP (ref 3.8–10.5)
WBC # FLD AUTO: 9.39 K/UL — SIGNIFICANT CHANGE UP (ref 3.8–10.5)

## 2021-06-26 RX ADMIN — Medication 650 MILLIGRAM(S): at 02:10

## 2021-06-26 RX ADMIN — AZITHROMYCIN 255 MILLIGRAM(S): 500 TABLET, FILM COATED ORAL at 20:04

## 2021-06-26 RX ADMIN — HEPARIN SODIUM 5000 UNIT(S): 5000 INJECTION INTRAVENOUS; SUBCUTANEOUS at 21:08

## 2021-06-26 RX ADMIN — Medication 650 MILLIGRAM(S): at 03:10

## 2021-06-26 RX ADMIN — ALBUTEROL 2 PUFF(S): 90 AEROSOL, METERED ORAL at 21:09

## 2021-06-26 RX ADMIN — HEPARIN SODIUM 5000 UNIT(S): 5000 INJECTION INTRAVENOUS; SUBCUTANEOUS at 06:15

## 2021-06-26 RX ADMIN — CEFTRIAXONE 100 MILLIGRAM(S): 500 INJECTION, POWDER, FOR SOLUTION INTRAMUSCULAR; INTRAVENOUS at 19:03

## 2021-06-26 RX ADMIN — TRAMADOL HYDROCHLORIDE 50 MILLIGRAM(S): 50 TABLET ORAL at 04:21

## 2021-06-26 RX ADMIN — HEPARIN SODIUM 5000 UNIT(S): 5000 INJECTION INTRAVENOUS; SUBCUTANEOUS at 14:44

## 2021-06-26 RX ADMIN — TRAMADOL HYDROCHLORIDE 50 MILLIGRAM(S): 50 TABLET ORAL at 03:13

## 2021-06-26 NOTE — PROGRESS NOTE ADULT - SUBJECTIVE AND OBJECTIVE BOX
SUBJECTIVE / OVERNIGHT EVENTS: pt seen and examined      MEDICATIONS  (STANDING):  azithromycin  IVPB      azithromycin  IVPB 500 milliGRAM(s) IV Intermittent every 24 hours  cefTRIAXone   IVPB 1000 milliGRAM(s) IV Intermittent every 24 hours  heparin   Injectable 5000 Unit(s) SubCutaneous every 8 hours    MEDICATIONS  (PRN):  acetaminophen    Suspension .. 650 milliGRAM(s) Oral every 6 hours PRN Mild Pain (1 - 3)  ALBUTerol    90 MICROgram(s) HFA Inhaler 2 Puff(s) Inhalation every 6 hours PRN Shortness of Breath and/or Wheezing  oxycodone    5 mG/acetaminophen 325 mG 1 Tablet(s) Oral every 8 hours PRN Moderate Pain (4 - 6)  traMADol 50 milliGRAM(s) Oral every 8 hours PRN Severe Pain (7 - 10)        CAPILLARY BLOOD GLUCOSE      Vital Signs Last 24 Hrs  T(C): 36.4 (26 Jun 2021 20:54), Max: 36.7 (26 Jun 2021 14:28)  T(F): 97.6 (26 Jun 2021 20:54), Max: 98.1 (26 Jun 2021 14:28)  HR: 90 (26 Jun 2021 20:54) (77 - 90)  BP: 130/89 (26 Jun 2021 20:54) (130/89 - 146/93)  BP(mean): --  RR: 18 (26 Jun 2021 20:54) (18 - 22)  SpO2: 98% (26 Jun 2021 20:54) (96% - 98%)  I&O's Summary      Constitutional: No fever, fatigue  Skin: No rash.  Eyes: No recent vision problems or eye pain.  ENT: No congestion, ear pain, or sore throat.  Cardiovascular: No chest pain or palpation.  Respiratory: No cough, shortness of breath, congestion, or wheezing.  Gastrointestinal: No abdominal pain, nausea, vomiting, or diarrhea.  Genitourinary: No dysuria.  Musculoskeletal: No joint swelling.  Neurologic: No headache.    PHYSICAL EXAM:  GENERAL: NAD  EYES: EOMI, PERRLA  NECK: Supple, No JVD  CHEST/LUNG: dec breath sounds left side , no wheezing  HEART:  S1 , S2 +  ABDOMEN: soft , bs+  EXTREMITIES:  no edema  NEUROLOGY:alert awake oriented       LABS:                        13.6   9.39  )-----------( 233      ( 26 Jun 2021 08:39 )             40.3     06-26    137  |  105  |  11  ----------------------------<  87  3.9   |  24  |  0.82    Ca    8.2<L>      26 Jun 2021 08:39  Phos  2.7     06-26  Mg     2.5     06-26    TPro  7.6  /  Alb  2.7<L>  /  TBili  0.4  /  DBili  x   /  AST  23  /  ALT  41  /  AlkPhos  118  06-25    PT/INR - ( 25 Jun 2021 20:56 )   PT: 15.6 sec;   INR: 1.33 ratio         PTT - ( 25 Jun 2021 20:56 )  PTT:32.0 sec  CARDIAC MARKERS ( 25 Jun 2021 13:56 )  <0.015 ng/mL / x     / x     / x     / x              RADIOLOGY & ADDITIONAL TESTS:    Imaging Personally Reviewed:    Consultant(s) Notes Reviewed:      Care Discussed with Consultants/Other Providers:

## 2021-06-26 NOTE — PROGRESS NOTE ADULT - SUBJECTIVE AND OBJECTIVE BOX
Time of Visit:  Patient seen and examined. pat is ambulation w=in room     MEDICATIONS  (STANDING):  azithromycin  IVPB      azithromycin  IVPB 500 milliGRAM(s) IV Intermittent every 24 hours  cefTRIAXone   IVPB 1000 milliGRAM(s) IV Intermittent every 24 hours  heparin   Injectable 5000 Unit(s) SubCutaneous every 8 hours      MEDICATIONS  (PRN):  acetaminophen    Suspension .. 650 milliGRAM(s) Oral every 6 hours PRN Mild Pain (1 - 3)  ALBUTerol    90 MICROgram(s) HFA Inhaler 2 Puff(s) Inhalation every 6 hours PRN Shortness of Breath and/or Wheezing  oxycodone    5 mG/acetaminophen 325 mG 1 Tablet(s) Oral every 8 hours PRN Moderate Pain (4 - 6)  traMADol 50 milliGRAM(s) Oral every 8 hours PRN Severe Pain (7 - 10)       Medications up to date at time of exam.      PHYSICAL EXAMINATION:  Patient has no new complaints.  GENERAL: The patient is a well-developed, well-nourished, in no apparent distress.     Vital Signs Last 24 Hrs  T(C): 36.7 (26 Jun 2021 14:28), Max: 36.7 (26 Jun 2021 14:28)  T(F): 98.1 (26 Jun 2021 14:28), Max: 98.1 (26 Jun 2021 14:28)  HR: 77 (26 Jun 2021 14:28) (77 - 97)  BP: 144/95 (26 Jun 2021 14:28) (144/95 - 159/99)  BP(mean): --  RR: 22 (26 Jun 2021 14:28) (17 - 22)  SpO2: 96% (26 Jun 2021 14:28) (96% - 97%)   (if applicable)    Chest Tube (if applicable)    HEENT: Head is normocephalic and atraumatic. Extraocular muscles are intact. Mucous membranes are moist.     NECK: Supple, no palpable adenopathy.    LUNGS: left side dullness    HEART: Regular rate and rhythm without murmur.    ABDOMEN: Soft, nontender, and nondistended.  No hepatosplenomegaly is noted.    : No painful voiding, no pelvic pain    EXTREMITIES: Without any cyanosis, clubbing, rash, lesions or edema.    NEUROLOGIC: Awake, alert, oriented, grossly intact    SKIN: Warm, dry, good turgor.      LABS:                        13.6   9.39  )-----------( 233      ( 26 Jun 2021 08:39 )             40.3     06-26    137  |  105  |  11  ----------------------------<  87  3.9   |  24  |  0.82    Ca    8.2<L>      26 Jun 2021 08:39  Phos  2.7     06-26  Mg     2.5     06-26    TPro  7.6  /  Alb  2.7<L>  /  TBili  0.4  /  DBili  x   /  AST  23  /  ALT  41  /  AlkPhos  118  06-25    PT/INR - ( 25 Jun 2021 20:56 )   PT: 15.6 sec;   INR: 1.33 ratio         PTT - ( 25 Jun 2021 20:56 )  PTT:32.0 sec      CARDIAC MARKERS ( 25 Jun 2021 13:56 )  <0.015 ng/mL / x     / x     / x     / x                    MICROBIOLOGY: (if applicable)    RADIOLOGY & ADDITIONAL STUDIES:  EKG:   CXR:  ECHO:    IMPRESSION: 66y Male PAST MEDICAL & SURGICAL HISTORY:  HLD (hyperlipidemia)    HTN (hypertension)     p/w         IMP: This is a66 yr old  man , active   smoker , HTN, HLD presented with dyspnea and cough due to massive left pleural effusion completely  filling  left hemithorax with out contralateral mediastinal shift . There is suspicion of underlying mass.  Pat is not in resp distress .       Plan     -air borne isolation   -sputum for AFB x 3  -O2 supp to maintain sat >90%  -Thoracic eval for VATS   -advise to stop smoking   -nicotine patch   -dvt/gi prophy  -PSA, CEA  dvt/ gi prophy    case discussed with atend / thoracic team

## 2021-06-26 NOTE — CONSULT NOTE ADULT - SUBJECTIVE AND OBJECTIVE BOX
HPI:  65 y/o male with a PMH of HTN, HLD  presents to the ED with c/o SOB, cough and chest tightness for the past 2 weeks. Patient went to urgent care today and was referred to the ED for further evaluation. Pt states he started having flu like symptoms with a runny nose and low grade fever of 100.5-101 2 weeks ago. He then started developing a cough and body aches with chest tightness. The chest tightness is more on the left side, 7/10 in intensity and radiating to the back and stomach. He has been having night sweats and chills for the past 8 days. Cough is associated with yellow coloured phlegm. SOB is worse when lying down. Pt denies any known Tuberculosis exposure. Pt moved to the  in 2003 from [place]. He smokes 1pack in 4 days and has been smoking for 40 years.   Patient denies fever, nausea, vomiting, or any other complaints. NKDA (25 Jun 2021 18:41)  as per his nurse who speaks Mandarin, pt has ambulated maintaining sats in upper 90s on RA  currently pt comfortable, no SOB, just c/o chest tightness  < from: CT Chest w/ IV Cont (06.25.21 @ 15:03) >  IMPRESSION:  Large left pleural effusion with associated atelectasis of the left lung as described above. Associated rightshift of the mediastinum.    Mildly enlarged subcarinal lymph node.    Ascending aortic aneurysm measuring 4.1 cm in diameter. 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    Mild COPD.    Sclerotic lesion in the left posterior fifth rib. Small sclerotic lesion in T2 vertebra. If clinically indicated, bone scan may be pursued for further evaluation.      < end of copied text >    PAST MEDICAL & SURGICAL HISTORY:  HLD (hyperlipidemia)    HTN (hypertension)        Vital Signs Last 24 Hrs  T(C): 36.4 (26 Jun 2021 04:55), Max: 36.5 (25 Jun 2021 11:27)  T(F): 97.6 (26 Jun 2021 04:55), Max: 97.7 (25 Jun 2021 11:27)  HR: 83 (26 Jun 2021 04:55) (83 - 101)  BP: 146/93 (26 Jun 2021 04:55) (146/93 - 159/99)  BP(mean): --  RR: 18 (26 Jun 2021 04:55) (16 - 22)  SpO2: 97% (26 Jun 2021 04:55) (97% - 99%)                          13.6   9.39  )-----------( 233      ( 26 Jun 2021 08:39 )             40.3     06-26    137  |  105  |  11  ----------------------------<  87  3.9   |  24  |  0.82    Ca    8.2<L>      26 Jun 2021 08:39  Phos  2.7     06-26  Mg     2.5     06-26    TPro  7.6  /  Alb  2.7<L>  /  TBili  0.4  /  DBili  x   /  AST  23  /  ALT  41  /  AlkPhos  118  06-25    PT/INR - ( 25 Jun 2021 20:56 )   PT: 15.6 sec;   INR: 1.33 ratio         PTT - ( 25 Jun 2021 20:56 )  PTT:32.0 sec    PHYSICAL EXAM  General: WN/WD NAD  Neurology: A&Ox3, nonfocal, GARDUNO x 4  Respiratory: dec bs L vs R but b/l air entry  CV: RRR, S1S2  Abdominal: Soft, NT, ND  Extremities: No edema, + peripheral pulses        ASSESSMENT/ PLAN:  65 y/o male, smoker with  SOB, cough and chest tightness for the past 2 weeks  admitted medical service, r/o TB  large L effusion  currently stable, asymptomatic at rest, sats 97-99 on RA  case discussed with Dr Magallanes, thoracic surgery  will cont to follow pt  plan would be for L VATS on Monday 6/28

## 2021-06-26 NOTE — CONSULT NOTE ADULT - SUBJECTIVE AND OBJECTIVE BOX
Patient is a 66y old  Male who presents with a chief complaint of Cough and Dyspnea (26 Jun 2021 11:07)      HPI:  65 y/o male with a PMH of HTN, HLD  presents to the ED with c/o SOB, cough and chest tightness for the past 2 weeks. Patient went to urgent care today and was referred to the ED for further evaluation. Pt states he started having flu like symptoms with a runny nose and low grade fever of 100.5-101 2 weeks ago. He then started developing a cough and body aches with chest tightness. The chest tightness is more on the left side, 7/10 in intensity and radiating to the back and stomach. He has been having night sweats and chills for the past 8 days. Cough is associated with yellow coloured phlegm. SOB is worse when lying down. Pt denies any known Tuberculosis exposure. Pt moved to the  in 2003 from [place]. He smokes 1pack in 4 days and has been smoking for 40 years.   Patient denies fever, nausea, vomiting, or any other complaints. NKDA (25 Jun 2021 18:41)       ROS:  Negative except for:    PAST MEDICAL & SURGICAL HISTORY:  HLD (hyperlipidemia)    HTN (hypertension)        SOCIAL HISTORY:    FAMILY HISTORY:      MEDICATIONS  (STANDING):  azithromycin  IVPB      azithromycin  IVPB 500 milliGRAM(s) IV Intermittent every 24 hours  cefTRIAXone   IVPB 1000 milliGRAM(s) IV Intermittent every 24 hours  heparin   Injectable 5000 Unit(s) SubCutaneous every 8 hours    MEDICATIONS  (PRN):  acetaminophen    Suspension .. 650 milliGRAM(s) Oral every 6 hours PRN Mild Pain (1 - 3)  ALBUTerol    90 MICROgram(s) HFA Inhaler 2 Puff(s) Inhalation every 6 hours PRN Shortness of Breath and/or Wheezing  oxycodone    5 mG/acetaminophen 325 mG 1 Tablet(s) Oral every 8 hours PRN Moderate Pain (4 - 6)  traMADol 50 milliGRAM(s) Oral every 8 hours PRN Severe Pain (7 - 10)      Allergies    No Known Allergies    Intolerances        Vital Signs Last 24 Hrs  T(C): 36.4 (26 Jun 2021 04:55), Max: 36.4 (25 Jun 2021 16:32)  T(F): 97.6 (26 Jun 2021 04:55), Max: 97.6 (25 Jun 2021 16:32)  HR: 83 (26 Jun 2021 04:55) (83 - 101)  BP: 146/93 (26 Jun 2021 04:55) (146/93 - 159/99)  BP(mean): --  RR: 18 (26 Jun 2021 04:55) (16 - 18)  SpO2: 97% (26 Jun 2021 04:55) (97% - 99%)    PHYSICAL EXAM  General: adult in NAD  HEENT: clear oropharynx, anicteric sclera, pink conjunctiva  Neck: supple  CV: normal S1/S2 with no murmur rubs or gallops  Lungs: positive air movement b/l ant lungs,clear to auscultation, no wheezes, no rales  Abdomen: soft non-tender non-distended, no hepatosplenomegaly  Ext: no clubbing cyanosis or edema  Skin: no rashes and no petechiae  Neuro: alert and oriented X 4, no focal deficits      LABS:                          13.6   9.39  )-----------( 233      ( 26 Jun 2021 08:39 )             40.3         Mean Cell Volume : 88.8 fl  Mean Cell Hemoglobin : 30.0 pg  Mean Cell Hemoglobin Concentration : 33.7 gm/dL  Auto Neutrophil # : x  Auto Lymphocyte # : x  Auto Monocyte # : x  Auto Eosinophil # : x  Auto Basophil # : x  Auto Neutrophil % : x  Auto Lymphocyte % : x  Auto Monocyte % : x  Auto Eosinophil % : x  Auto Basophil % : x      < from: CT Chest w/ IV Cont (06.25.21 @ 15:03) >  PROCEDURE:  CT of the Chest was performed.  Sagittal and coronal reformats were performed.    FINDINGS: Respiratory motion artifact limits evaluation.    LUNGS AND AIRWAYS: Patent central airways.  Small calcified granuloma in the right lung. Near-complete compressive atelectasis of the left lower lobe. Partial compressive atelectasis of the left upper lobe. Complete compressiveatelectasis of the lingula. Mild emphysematous changes in both lungs, compatible with COPD.  PLEURA: Large left pleural effusion. No evidence for pneumothorax.  MEDIASTINUM AND AKIRA: Mildly enlarged 1.1 cm subcarinal lymph node. Right shift of the mediastinum due to the large left pleural effusion.  VESSELS: Ascending aortic aneurysm measuring 4.1 cm in diameter. 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 No evidence for aortic dissection. Small aortic calcifications.  HEART: Heart size is normal. No pericardial effusion.  CHEST WALL AND LOWER NECK: Within normal limits.  VISUALIZED UPPER ABDOMEN: Small hypodense lesions in the liver, too small to characterize. Colonic diverticulosis.  BONES: Sclerotic lesion in the left posterior fifth rib. Small sclerotic lesion in T2 vertebra. If clinically indicated, bone scan may be pursued for further evaluation.    IMPRESSION:  Large left pleural effusion with associated atelectasis of the left lung as described above. Associated rightshift of the mediastinum.      < end of copied text >  < from: CT Chest w/ IV Cont (06.25.21 @ 15:03) >  Mildly enlarged subcarinal lymph node.    Ascending aortic aneurysm measuring 4.1 cm in diameter. 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    Mild COPD.    Sclerotic lesion in the left posterior fifth rib. Small sclerotic lesion in T2 vertebra. If clinically indicated, bone scan may be pursued for further evaluation.      < end of copied text >  Serial CBC's  06-26 @ 08:39  Hct-40.3 / Hgb-13.6 / Plat-233 / RBC-4.54 / WBC-9.39  Serial CBC's  06-25 @ 13:56  Hct-43.8 / Hgb-14.6 / Plat-453 / RBC-4.87 / WBC-9.34      06-26    137  |  105  |  11  ----------------------------<  87  3.9   |  24  |  0.82    Ca    8.2<L>      26 Jun 2021 08:39  Phos  2.7     06-26  Mg     2.5     06-26    TPro  7.6  /  Alb  2.7<L>  /  TBili  0.4  /  DBili  x   /  AST  23  /  ALT  41  /  AlkPhos  118  06-25      PT/INR - ( 25 Jun 2021 20:56 )   PT: 15.6 sec;   INR: 1.33 ratio         PTT - ( 25 Jun 2021 20:56 )  PTT:32.0 sec                BLOOD SMEAR INTERPRETATION:       RADIOLOGY & ADDITIONAL STUDIES:

## 2021-06-26 NOTE — PROGRESS NOTE ADULT - PROBLEM SELECTOR PLAN 1
Pt coming in with dyspnea, pleuritic chest pain and cough  Active smoker  Decreased breath sounds on left side  Malignancy vs PNA   CT: Large left pleural effusion with associated atelectasis of the left lung as described above. Associated right shift of the mediastinum. Sclerotic lesion in the left posterior fifth rib. Small sclerotic lesion in T2 vertebra.  Will start on Rocephin and Azithro empirically   Pt will likely need a thoracentesis   Follow QuantiFeron for TB   pulm f/u

## 2021-06-26 NOTE — CONSULT NOTE ADULT - ASSESSMENT
66 year old male, smoker, presented with cough, sob, and chest tightness for 2 weeks.  It is getting worse.  CT chest showed very large left effusion    1. large left effusion in a smoker  highly suspicious for malig  will do chest tube  because of right shift of mediastinum  he needs effusion to be drained right away if he develops sob  needs to be monitored closely  will check cytology of effusion    2. bone mets  will check CEA, PSA

## 2021-06-27 ENCOUNTER — TRANSCRIPTION ENCOUNTER (OUTPATIENT)
Age: 66
End: 2021-06-27

## 2021-06-27 LAB
ABO RH CONFIRMATION: SIGNIFICANT CHANGE UP
ANION GAP SERPL CALC-SCNC: 9 MMOL/L — SIGNIFICANT CHANGE UP (ref 5–17)
BUN SERPL-MCNC: 13 MG/DL — SIGNIFICANT CHANGE UP (ref 7–18)
CALCIUM SERPL-MCNC: 8.5 MG/DL — SIGNIFICANT CHANGE UP (ref 8.4–10.5)
CHLORIDE SERPL-SCNC: 102 MMOL/L — SIGNIFICANT CHANGE UP (ref 96–108)
CO2 SERPL-SCNC: 24 MMOL/L — SIGNIFICANT CHANGE UP (ref 22–31)
CREAT SERPL-MCNC: 0.94 MG/DL — SIGNIFICANT CHANGE UP (ref 0.5–1.3)
GLUCOSE SERPL-MCNC: 106 MG/DL — HIGH (ref 70–99)
HCT VFR BLD CALC: 42 % — SIGNIFICANT CHANGE UP (ref 39–50)
HGB BLD-MCNC: 14.1 G/DL — SIGNIFICANT CHANGE UP (ref 13–17)
MAGNESIUM SERPL-MCNC: 2.7 MG/DL — HIGH (ref 1.6–2.6)
MCHC RBC-ENTMCNC: 29.9 PG — SIGNIFICANT CHANGE UP (ref 27–34)
MCHC RBC-ENTMCNC: 33.6 GM/DL — SIGNIFICANT CHANGE UP (ref 32–36)
MCV RBC AUTO: 89.2 FL — SIGNIFICANT CHANGE UP (ref 80–100)
NRBC # BLD: 0 /100 WBCS — SIGNIFICANT CHANGE UP (ref 0–0)
PHOSPHATE SERPL-MCNC: 2.6 MG/DL — SIGNIFICANT CHANGE UP (ref 2.5–4.5)
PLATELET # BLD AUTO: 509 K/UL — HIGH (ref 150–400)
POTASSIUM SERPL-MCNC: 3.8 MMOL/L — SIGNIFICANT CHANGE UP (ref 3.5–5.3)
POTASSIUM SERPL-SCNC: 3.8 MMOL/L — SIGNIFICANT CHANGE UP (ref 3.5–5.3)
RBC # BLD: 4.71 M/UL — SIGNIFICANT CHANGE UP (ref 4.2–5.8)
RBC # FLD: 13.4 % — SIGNIFICANT CHANGE UP (ref 10.3–14.5)
SARS-COV-2 RNA SPEC QL NAA+PROBE: SIGNIFICANT CHANGE UP
SODIUM SERPL-SCNC: 135 MMOL/L — SIGNIFICANT CHANGE UP (ref 135–145)
WBC # BLD: 8.84 K/UL — SIGNIFICANT CHANGE UP (ref 3.8–10.5)
WBC # FLD AUTO: 8.84 K/UL — SIGNIFICANT CHANGE UP (ref 3.8–10.5)

## 2021-06-27 RX ORDER — NICOTINE POLACRILEX 2 MG
1 GUM BUCCAL DAILY
Refills: 0 | Status: DISCONTINUED | OUTPATIENT
Start: 2021-06-27 | End: 2021-06-28

## 2021-06-27 RX ADMIN — Medication 100 MILLIGRAM(S): at 23:32

## 2021-06-27 RX ADMIN — Medication 1 PATCH: at 17:05

## 2021-06-27 RX ADMIN — AZITHROMYCIN 255 MILLIGRAM(S): 500 TABLET, FILM COATED ORAL at 18:47

## 2021-06-27 RX ADMIN — TRAMADOL HYDROCHLORIDE 50 MILLIGRAM(S): 50 TABLET ORAL at 10:30

## 2021-06-27 RX ADMIN — HEPARIN SODIUM 5000 UNIT(S): 5000 INJECTION INTRAVENOUS; SUBCUTANEOUS at 13:37

## 2021-06-27 RX ADMIN — Medication 1 PATCH: at 20:15

## 2021-06-27 RX ADMIN — Medication 100 MILLIGRAM(S): at 12:32

## 2021-06-27 RX ADMIN — TRAMADOL HYDROCHLORIDE 50 MILLIGRAM(S): 50 TABLET ORAL at 09:54

## 2021-06-27 RX ADMIN — HEPARIN SODIUM 5000 UNIT(S): 5000 INJECTION INTRAVENOUS; SUBCUTANEOUS at 06:33

## 2021-06-27 RX ADMIN — CEFTRIAXONE 100 MILLIGRAM(S): 500 INJECTION, POWDER, FOR SOLUTION INTRAMUSCULAR; INTRAVENOUS at 18:46

## 2021-06-27 NOTE — PROGRESS NOTE ADULT - ASSESSMENT
66y.o. Male with large left pleural effusion    -OR 6/28/2021  -NPO after midnight except PO meds  -IVF when NPO  -Chlorhexidine wipes  -DVT ppx  -2U PRBC hold for OR  -Smoking cessation  -Cardio cleared

## 2021-06-27 NOTE — PROGRESS NOTE ADULT - SUBJECTIVE AND OBJECTIVE BOX
Surgery Pre-op    Dx: Large left pleural effusion  Procedure: L VATS, possible pleurodesis, possible biopsy    Vital Signs Last 24 Hrs  T(C): 36.4 (27 Jun 2021 05:34), Max: 36.7 (26 Jun 2021 14:28)  T(F): 97.6 (27 Jun 2021 05:34), Max: 98.1 (26 Jun 2021 14:28)  HR: 92 (27 Jun 2021 05:34) (77 - 92)  BP: 142/85 (27 Jun 2021 05:34) (130/89 - 144/95)  BP(mean): --  RR: 18 (27 Jun 2021 05:34) (18 - 22)  SpO2: 96% (27 Jun 2021 05:34) (96% - 98%)    LABS:                        13.6   9.39  )-----------( 233      ( 26 Jun 2021 08:39 )             40.3              06-26    137  |  105  |  11  ----------------------------<  87  3.9   |  24  |  0.82    Ca    8.2<L>      26 Jun 2021 08:39  Phos  2.7     06-26  Mg     2.5     06-26    TPro  7.6  /  Alb  2.7<L>  /  TBili  0.4  /  DBili  x   /  AST  23  /  ALT  41  /  AlkPhos  118  06-25    PT/INR - ( 25 Jun 2021 20:56 )   PT: 15.6 sec;   INR: 1.33 ratio         PTT - ( 25 Jun 2021 20:56 )  PTT:32.0 sec    RADIOLOGY & ADDITIONAL STUDIES:  < from: CT Chest w/ IV Cont (06.25.21 @ 15:03) >  IMPRESSION:  Large left pleural effusion with associated atelectasis of the left lung as described above. Associated rightshift of the mediastinum.    Mildly enlarged subcarinal lymph node.    Ascending aortic aneurysm measuring 4.1 cm in diameter. 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    Mild COPD.    Sclerotic lesion in the left posterior fifth rib. Small sclerotic lesion in T2 vertebra. If clinically indicated, bone scan may be pursued for further evaluation.    < end of copied text >

## 2021-06-27 NOTE — PROGRESS NOTE ADULT - SUBJECTIVE AND OBJECTIVE BOX
Time of Visit:  Patient seen and examined.     MEDICATIONS  (STANDING):  azithromycin  IVPB      azithromycin  IVPB 500 milliGRAM(s) IV Intermittent every 24 hours  cefTRIAXone   IVPB 1000 milliGRAM(s) IV Intermittent every 24 hours  heparin   Injectable 5000 Unit(s) SubCutaneous every 8 hours      MEDICATIONS  (PRN):  acetaminophen    Suspension .. 650 milliGRAM(s) Oral every 6 hours PRN Mild Pain (1 - 3)  ALBUTerol    90 MICROgram(s) HFA Inhaler 2 Puff(s) Inhalation every 6 hours PRN Shortness of Breath and/or Wheezing  guaiFENesin Oral Liquid (Sugar-Free) 100 milliGRAM(s) Oral every 6 hours PRN Cough  oxycodone    5 mG/acetaminophen 325 mG 1 Tablet(s) Oral every 8 hours PRN Moderate Pain (4 - 6)  traMADol 50 milliGRAM(s) Oral every 8 hours PRN Severe Pain (7 - 10)       Medications up to date at time of exam.      PHYSICAL EXAMINATION:  Patient has no new complaints.  GENERAL: The patient is a well-developed, well-nourished, in no apparent distress.     Vital Signs Last 24 Hrs  T(C): 36.3 (27 Jun 2021 12:32), Max: 36.7 (26 Jun 2021 14:28)  T(F): 97.4 (27 Jun 2021 12:32), Max: 98.1 (26 Jun 2021 14:28)  HR: 82 (27 Jun 2021 12:32) (77 - 92)  BP: 138/83 (27 Jun 2021 12:32) (130/89 - 144/95)  BP(mean): --  RR: 19 (27 Jun 2021 12:32) (18 - 22)  SpO2: 98% (27 Jun 2021 12:32) (96% - 98%)   (if applicable)    Chest Tube (if applicable)    HEENT: Head is normocephalic and atraumatic. Extraocular muscles are intact. Mucous membranes are moist.     NECK: Supple, no palpable adenopathy.    LUNGS: Clear to auscultation on right side . + left hemithorax dullness     HEART: Regular rate and rhythm without murmur.    ABDOMEN: Soft, nontender, and nondistended.  No hepatosplenomegaly is noted.    : No painful voiding, no pelvic pain    EXTREMITIES: Without any cyanosis, clubbing, rash, lesions or edema.    NEUROLOGIC: Awake, alert, oriented, grossly intact    SKIN: Warm, dry, good turgor.      LABS:                        14.1   8.84  )-----------( 509      ( 27 Jun 2021 10:34 )             42.0     06-27    135  |  102  |  13  ----------------------------<  106<H>  3.8   |  24  |  0.94    Ca    8.5      27 Jun 2021 10:34  Phos  2.6     06-27  Mg     2.7     06-27      PT/INR - ( 25 Jun 2021 20:56 )   PT: 15.6 sec;   INR: 1.33 ratio         PTT - ( 25 Jun 2021 20:56 )  PTT:32.0 sec                    MICROBIOLOGY: (if applicable)    RADIOLOGY & ADDITIONAL STUDIES:  EKG:   CXR:  ECHO:    IMPRESSION: 66y Male PAST MEDICAL & SURGICAL HISTORY:  HLD (hyperlipidemia)    HTN (hypertension)     p/w         IMP: This is a66 yr old  man , active   smoker , HTN, HLD presented with dyspnea and cough due to massive left pleural effusion completely  filling  left hemithorax with out contralateral mediastinal shift . There is suspicion of underlying mass.  Pat is not in resp distress .       Plan     -air borne isolation   -sputum for AFB x 3  -O2 supp to maintain sat >90%  -Thoracic eval for VATS  6/28  -advise to stop smoking   -nicotine patch   -dvt/gi prophy  -PSA, CEA  dvt/ gi prophy

## 2021-06-27 NOTE — PROGRESS NOTE ADULT - SUBJECTIVE AND OBJECTIVE BOX
SUBJECTIVE / OVERNIGHT EVENTS: pt seen and examined    MEDICATIONS  (STANDING):  azithromycin  IVPB      azithromycin  IVPB 500 milliGRAM(s) IV Intermittent every 24 hours  cefTRIAXone   IVPB 1000 milliGRAM(s) IV Intermittent every 24 hours  heparin   Injectable 5000 Unit(s) SubCutaneous every 8 hours  nicotine -  14 mG/24Hr(s) Patch 1 patch Transdermal daily    MEDICATIONS  (PRN):  acetaminophen    Suspension .. 650 milliGRAM(s) Oral every 6 hours PRN Mild Pain (1 - 3)  ALBUTerol    90 MICROgram(s) HFA Inhaler 2 Puff(s) Inhalation every 6 hours PRN Shortness of Breath and/or Wheezing  guaiFENesin Oral Liquid (Sugar-Free) 100 milliGRAM(s) Oral every 6 hours PRN Cough  oxycodone    5 mG/acetaminophen 325 mG 1 Tablet(s) Oral every 8 hours PRN Moderate Pain (4 - 6)  traMADol 50 milliGRAM(s) Oral every 8 hours PRN Severe Pain (7 - 10)    Vital Signs Last 24 Hrs  T(C): 36.3 (27 Jun 2021 12:32), Max: 36.4 (26 Jun 2021 20:54)  T(F): 97.4 (27 Jun 2021 12:32), Max: 97.6 (26 Jun 2021 20:54)  HR: 82 (27 Jun 2021 12:32) (82 - 92)  BP: 138/83 (27 Jun 2021 12:32) (130/89 - 142/85)  BP(mean): --  RR: 19 (27 Jun 2021 12:32) (18 - 19)  SpO2: 98% (27 Jun 2021 12:32) (96% - 98%)    Constitutional: No fever, fatigue  Skin: No rash.  Eyes: No recent vision problems or eye pain.  ENT: No congestion, ear pain, or sore throat.  Cardiovascular: No chest pain or palpation.  Respiratory: No cough, shortness of breath, congestion, or wheezing.  Gastrointestinal: No abdominal pain, nausea, vomiting, or diarrhea.  Genitourinary: No dysuria.  Musculoskeletal: No joint swelling.  Neurologic: No headache.    PHYSICAL EXAM:  GENERAL: NAD  EYES: EOMI, PERRLA  NECK: Supple, No JVD  CHEST/LUNG: dec breath sounds left side , no wheezing  HEART:  S1 , S2 +  ABDOMEN: soft , bs+  EXTREMITIES:  no edema  NEUROLOGY:alert awake oriented     LABS:  06-27    135  |  102  |  13  ----------------------------<  106<H>  3.8   |  24  |  0.94    Ca    8.5      27 Jun 2021 10:34  Phos  2.6     06-27  Mg     2.7     06-27      Creatinine Trend: 0.94 <--, 0.82 <--, 0.98 <--                        14.1   8.84  )-----------( 509      ( 27 Jun 2021 10:34 )             42.0     Urine Studies:              PT/INR - ( 25 Jun 2021 20:56 )   PT: 15.6 sec;   INR: 1.33 ratio         PTT - ( 25 Jun 2021 20:56 )  PTT:32.0 sec  CARDIAC MARKERS ( 25 Jun 2021 13:56 )  <0.015 ng/mL / x     / x     / x     / x              RADIOLOGY & ADDITIONAL TESTS:    Imaging Personally Reviewed:    Consultant(s) Notes Reviewed:      Care Discussed with Consultants/Other Providers:

## 2021-06-28 ENCOUNTER — RESULT REVIEW (OUTPATIENT)
Age: 66
End: 2021-06-28

## 2021-06-28 LAB
ANION GAP SERPL CALC-SCNC: 12 MMOL/L — SIGNIFICANT CHANGE UP (ref 5–17)
BUN SERPL-MCNC: 14 MG/DL — SIGNIFICANT CHANGE UP (ref 7–18)
CALCIUM SERPL-MCNC: 8.6 MG/DL — SIGNIFICANT CHANGE UP (ref 8.4–10.5)
CHLORIDE SERPL-SCNC: 101 MMOL/L — SIGNIFICANT CHANGE UP (ref 96–108)
CO2 SERPL-SCNC: 21 MMOL/L — LOW (ref 22–31)
CREAT SERPL-MCNC: 0.87 MG/DL — SIGNIFICANT CHANGE UP (ref 0.5–1.3)
GLUCOSE BLDC GLUCOMTR-MCNC: 101 MG/DL — HIGH (ref 70–99)
GLUCOSE BLDC GLUCOMTR-MCNC: 58 MG/DL — LOW (ref 70–99)
GLUCOSE BLDC GLUCOMTR-MCNC: 78 MG/DL — SIGNIFICANT CHANGE UP (ref 70–99)
GLUCOSE BLDC GLUCOMTR-MCNC: 97 MG/DL — SIGNIFICANT CHANGE UP (ref 70–99)
GLUCOSE BLDC GLUCOMTR-MCNC: 99 MG/DL — SIGNIFICANT CHANGE UP (ref 70–99)
GLUCOSE SERPL-MCNC: 88 MG/DL — SIGNIFICANT CHANGE UP (ref 70–99)
HCT VFR BLD CALC: 41.5 % — SIGNIFICANT CHANGE UP (ref 39–50)
HGB BLD-MCNC: 14.3 G/DL — SIGNIFICANT CHANGE UP (ref 13–17)
MAGNESIUM SERPL-MCNC: 2.5 MG/DL — SIGNIFICANT CHANGE UP (ref 1.6–2.6)
MCHC RBC-ENTMCNC: 30.5 PG — SIGNIFICANT CHANGE UP (ref 27–34)
MCHC RBC-ENTMCNC: 34.5 GM/DL — SIGNIFICANT CHANGE UP (ref 32–36)
MCV RBC AUTO: 88.5 FL — SIGNIFICANT CHANGE UP (ref 80–100)
NRBC # BLD: 0 /100 WBCS — SIGNIFICANT CHANGE UP (ref 0–0)
PHOSPHATE SERPL-MCNC: 2.7 MG/DL — SIGNIFICANT CHANGE UP (ref 2.5–4.5)
PLATELET # BLD AUTO: 498 K/UL — HIGH (ref 150–400)
POTASSIUM SERPL-MCNC: 4.1 MMOL/L — SIGNIFICANT CHANGE UP (ref 3.5–5.3)
POTASSIUM SERPL-SCNC: 4.1 MMOL/L — SIGNIFICANT CHANGE UP (ref 3.5–5.3)
RBC # BLD: 4.69 M/UL — SIGNIFICANT CHANGE UP (ref 4.2–5.8)
RBC # FLD: 13.3 % — SIGNIFICANT CHANGE UP (ref 10.3–14.5)
SODIUM SERPL-SCNC: 134 MMOL/L — LOW (ref 135–145)
WBC # BLD: 8.67 K/UL — SIGNIFICANT CHANGE UP (ref 3.8–10.5)
WBC # FLD AUTO: 8.67 K/UL — SIGNIFICANT CHANGE UP (ref 3.8–10.5)

## 2021-06-28 PROCEDURE — 88305 TISSUE EXAM BY PATHOLOGIST: CPT | Mod: 26

## 2021-06-28 PROCEDURE — 32609 THORACOSCOPY W/BX PLEURA: CPT

## 2021-06-28 PROCEDURE — 32550 INSERT PLEURAL CATH: CPT

## 2021-06-28 PROCEDURE — 31624 DX BRONCHOSCOPE/LAVAGE: CPT

## 2021-06-28 PROCEDURE — 88112 CYTOPATH CELL ENHANCE TECH: CPT | Mod: 26

## 2021-06-28 PROCEDURE — 31645 BRNCHSC W/THER ASPIR 1ST: CPT

## 2021-06-28 RX ORDER — AZITHROMYCIN 500 MG/1
500 TABLET, FILM COATED ORAL EVERY 24 HOURS
Refills: 0 | Status: DISCONTINUED | OUTPATIENT
Start: 2021-06-29 | End: 2021-07-02

## 2021-06-28 RX ORDER — ALBUTEROL 90 UG/1
2 AEROSOL, METERED ORAL EVERY 6 HOURS
Refills: 0 | Status: DISCONTINUED | OUTPATIENT
Start: 2021-06-28 | End: 2021-07-07

## 2021-06-28 RX ORDER — ACETAMINOPHEN 500 MG
650 TABLET ORAL EVERY 6 HOURS
Refills: 0 | Status: DISCONTINUED | OUTPATIENT
Start: 2021-06-28 | End: 2021-07-07

## 2021-06-28 RX ORDER — SODIUM CHLORIDE 9 MG/ML
1000 INJECTION, SOLUTION INTRAVENOUS
Refills: 0 | Status: DISCONTINUED | OUTPATIENT
Start: 2021-06-28 | End: 2021-06-28

## 2021-06-28 RX ORDER — HYDROMORPHONE HYDROCHLORIDE 2 MG/ML
0.5 INJECTION INTRAMUSCULAR; INTRAVENOUS; SUBCUTANEOUS
Refills: 0 | Status: DISCONTINUED | OUTPATIENT
Start: 2021-06-28 | End: 2021-06-28

## 2021-06-28 RX ORDER — SODIUM CHLORIDE 9 MG/ML
1000 INJECTION, SOLUTION INTRAVENOUS
Refills: 0 | Status: DISCONTINUED | OUTPATIENT
Start: 2021-06-28 | End: 2021-06-29

## 2021-06-28 RX ORDER — NICOTINE POLACRILEX 2 MG
1 GUM BUCCAL DAILY
Refills: 0 | Status: DISCONTINUED | OUTPATIENT
Start: 2021-06-28 | End: 2021-07-07

## 2021-06-28 RX ORDER — OXYCODONE AND ACETAMINOPHEN 5; 325 MG/1; MG/1
1 TABLET ORAL EVERY 8 HOURS
Refills: 0 | Status: DISCONTINUED | OUTPATIENT
Start: 2021-06-28 | End: 2021-06-28

## 2021-06-28 RX ORDER — AZITHROMYCIN 500 MG/1
TABLET, FILM COATED ORAL
Refills: 0 | Status: DISCONTINUED | OUTPATIENT
Start: 2021-06-28 | End: 2021-07-02

## 2021-06-28 RX ORDER — CEFTRIAXONE 500 MG/1
1000 INJECTION, POWDER, FOR SOLUTION INTRAMUSCULAR; INTRAVENOUS EVERY 24 HOURS
Refills: 0 | Status: DISCONTINUED | OUTPATIENT
Start: 2021-06-28 | End: 2021-07-02

## 2021-06-28 RX ORDER — AZITHROMYCIN 500 MG/1
500 TABLET, FILM COATED ORAL ONCE
Refills: 0 | Status: COMPLETED | OUTPATIENT
Start: 2021-06-28 | End: 2021-06-28

## 2021-06-28 RX ADMIN — Medication 1 PATCH: at 12:51

## 2021-06-28 RX ADMIN — SODIUM CHLORIDE 75 MILLILITER(S): 9 INJECTION, SOLUTION INTRAVENOUS at 05:44

## 2021-06-28 RX ADMIN — AZITHROMYCIN 255 MILLIGRAM(S): 500 TABLET, FILM COATED ORAL at 22:22

## 2021-06-28 RX ADMIN — Medication 1 PATCH: at 08:00

## 2021-06-28 RX ADMIN — SODIUM CHLORIDE 75 MILLILITER(S): 9 INJECTION, SOLUTION INTRAVENOUS at 10:44

## 2021-06-28 NOTE — PROGRESS NOTE ADULT - PROBLEM SELECTOR PLAN 1
Malignancy vs PNA   CT: Large left pleural effusion with associated atelectasis of the left lung   Associated right shift of the mediastinum.   continue Rocephin and Azithro empirically   Follow QuantiFeron for TB   pulmonary Dr. Mendez consulted   Dr. López hem/onc on board    VATS scheduled for today 6/28

## 2021-06-28 NOTE — PROGRESS NOTE ADULT - ASSESSMENT
67 y/o male with a PMH of HTN, HLD, smoker  presents to the ED with c/o SOB, cough and chest tightness for the past 2 weeks. Also reported  night sweats and chills for one week   CT Chest w/ IV Cont Large left pleural effusion with associated atelectasis of the left lung as described above. Associated rightshift of the mediastinum. Mildly enlarged subcarinal lymph node.  Admitted for pleural effusion ro malignancy, ro TB   Pt seen by thoracic surgery, recommended left VATS 6/28  Pt seen at bedside, NAD, co intermittent SOB, no chest pain, pending interferon gold      67 y/o male with a PMH of HTN, HLD, smoker  presents to the ED with c/o SOB, cough and chest tightness for the past 2 weeks. Also reported  night sweats and chills for one week   CT Chest w/ IV Cont Large left pleural effusion with associated atelectasis of the left lung as described above. Associated rightshift of the mediastinum. Mildly enlarged subcarinal lymph node.  Admitted for pleural effusion ro malignancy, ro TB   Pt seen by thoracic surgery, recommended left VATS 6/28  Pt seen at bedside, NAD, co intermittent SOB, no chest pain, pending interferon gold   Pt went to the OR for VATS at 16:20, medically cleared with moderate risk for the procedure

## 2021-06-28 NOTE — PROGRESS NOTE ADULT - SUBJECTIVE AND OBJECTIVE BOX
for OR today , large left pleural effusion - possible malignant effusion . will plan for OR for vats, possible pleural biopsy. possible pleurx catheter possible pleurodesis.   discussed risks of surgery including and not limited to bleeding, infection, injury to surroundingstructures prolonged air leak.   patient agreeable.

## 2021-06-28 NOTE — PROGRESS NOTE ADULT - SUBJECTIVE AND OBJECTIVE BOX
Time of Visit:  Patient seen and examined.     MEDICATIONS  (STANDING):      MEDICATIONS  (PRN):       Medications up to date at time of exam.    ROS; No fever, chills, cough, congestion.   PHYSICAL EXAMINATION:    Vital Signs Last 24 Hrs  T(C): 37.1 (28 Jun 2021 14:12), Max: 37.1 (28 Jun 2021 14:12)  T(F): 98.8 (28 Jun 2021 14:12), Max: 98.8 (28 Jun 2021 14:12)  HR: 86 (28 Jun 2021 14:12) (82 - 97)  BP: 156/94 (28 Jun 2021 14:12) (141/92 - 156/94)  BP(mean): --  RR: 20 (28 Jun 2021 14:12) (18 - 20)  SpO2: 97% (28 Jun 2021 14:12) (95% - 97%)   (if applicable)    General: Alert and oriented. Able to answer question with no SOB. No acute distress.      HEENT: Head is normocephalic and atraumatic. No nasal tenderness. Extraocular muscles are intact. Mucous membranes are moist.     NECK: Supple, no palpable adenopathy.    LUNGS: Decreased breath sounds, otherwise clear to auscultation with no wheezing, rales, or rhonchi. No use of accessory muscle.     HEART: S1 S2 Regular rate and no click/ rub.     ABDOMEN: Soft, nontender, and nondistended.  No hepatosplenomegaly is noted. Active bowel sounds.     EXTREMITIES: Without any cyanosis, clubbing, rash, lesions or edema.    NEUROLOGIC: Awake, alert, oriented.     SKIN: Warm, dry, good turgor.      LABS:                        14.3   8.67  )-----------( 498      ( 28 Jun 2021 08:08 )             41.5     06-28    134<L>  |  101  |  14  ----------------------------<  88  4.1   |  21<L>  |  0.87    Ca    8.6      28 Jun 2021 08:08  Phos  2.7     06-28  Mg     2.5     06-28                          MICROBIOLOGY: (if applicable)    RADIOLOGY & ADDITIONAL STUDIES:  EKG:   CXR:  ECHO:    IMPRESSION: 66y Male PAST MEDICAL & SURGICAL HISTORY:  HLD (hyperlipidemia)    HTN (hypertension)     IMP: This is a66 yr old  man , active   smoker.  Presented with dyspnea and cough due to massive left pleural effusion completely  filling  left hemithorax with out contralateral mediastinal shift . There is suspicion of underlying mass.  Patient  is not in respiratory  distress .  06-27-21, 06-25-21 Negative for Covid 19 PCR.       Plan   O2 saturation 98% room air . So far saturating good room air.   -Air borne isolation   -Sputum for AFB x 3  Reinforced the importance of smoking cessation.   Thoracic Team for OR  VATS today with  possible pleural biopsy. possible pleurx catheter possible pleurodesis.  Time of Visit:  Patient seen and examined.     MEDICATIONS  (STANDING):      MEDICATIONS  (PRN):       Medications up to date at time of exam.    ROS; No fever, chills, cough, congestion.   PHYSICAL EXAMINATION:    Vital Signs Last 24 Hrs  T(C): 37.1 (28 Jun 2021 14:12), Max: 37.1 (28 Jun 2021 14:12)  T(F): 98.8 (28 Jun 2021 14:12), Max: 98.8 (28 Jun 2021 14:12)  HR: 86 (28 Jun 2021 14:12) (82 - 97)  BP: 156/94 (28 Jun 2021 14:12) (141/92 - 156/94)  BP(mean): --  RR: 20 (28 Jun 2021 14:12) (18 - 20)  SpO2: 97% (28 Jun 2021 14:12) (95% - 97%)   (if applicable)    General: Alert and oriented. Able to answer question with no SOB. No acute distress.      HEENT: Head is normocephalic and atraumatic. No nasal tenderness. Extraocular muscles are intact. Mucous membranes are moist.     NECK: Supple, no palpable adenopathy.    LUNGS: Decreased breath sounds, otherwise clear to auscultation with no wheezing, rales, or rhonchi. No use of accessory muscle.     HEART: S1 S2 Regular rate and no click/ rub.     ABDOMEN: Soft, nontender, and nondistended.  No hepatosplenomegaly is noted. Active bowel sounds.     EXTREMITIES: Without any cyanosis, clubbing, rash, lesions or edema.    NEUROLOGIC: Awake, alert, oriented.     SKIN: Warm, dry, good turgor.      LABS:                        14.3   8.67  )-----------( 498      ( 28 Jun 2021 08:08 )             41.5     06-28    134<L>  |  101  |  14  ----------------------------<  88  4.1   |  21<L>  |  0.87    Ca    8.6      28 Jun 2021 08:08  Phos  2.7     06-28  Mg     2.5     06-28                          MICROBIOLOGY: (if applicable)    RADIOLOGY & ADDITIONAL STUDIES:  EKG:   CXR:  ECHO:    IMPRESSION: 66y Male PAST MEDICAL & SURGICAL HISTORY:  HLD (hyperlipidemia)    HTN (hypertension)     IMP: This is a66 yr old  man , active   smoker.  Presented with dyspnea and cough due to massive left pleural effusion completely  filling  left hemithorax with out contralateral mediastinal shift . There is suspicion of underlying mass.  Patient  is not in respiratory  distress .  06-27-21, 06-25-21 Negative for Covid 19 PCR.       Plan   O2 saturation 98% room air . So far saturating good room air.   -Air borne isolation   -Sputum for AFB x 3  Reinforced the importance of smoking cessation.   Thoracic Team for OR  VATS today with  possible pleural biopsy. possible pleurx catheter possible pleurodesis.   Agree with above assessment and plan as transcribed.

## 2021-06-28 NOTE — PROGRESS NOTE ADULT - SUBJECTIVE AND OBJECTIVE BOX
NP Note discussed with  primary attending    Patient is a 66y old  Male who presents with a chief complaint of Cough and Dyspnea (28 Jun 2021 08:57)      INTERVAL HPI/OVERNIGHT EVENTS: no new complaints    MEDICATIONS  (STANDING):  azithromycin  IVPB      azithromycin  IVPB 500 milliGRAM(s) IV Intermittent every 24 hours  cefTRIAXone   IVPB 1000 milliGRAM(s) IV Intermittent every 24 hours  dextrose 5% + sodium chloride 0.9%. 1000 milliLiter(s) (75 mL/Hr) IV Continuous <Continuous>  nicotine -  14 mG/24Hr(s) Patch 1 patch Transdermal daily    MEDICATIONS  (PRN):  acetaminophen    Suspension .. 650 milliGRAM(s) Oral every 6 hours PRN Mild Pain (1 - 3)  ALBUTerol    90 MICROgram(s) HFA Inhaler 2 Puff(s) Inhalation every 6 hours PRN Shortness of Breath and/or Wheezing  guaiFENesin Oral Liquid (Sugar-Free) 100 milliGRAM(s) Oral every 6 hours PRN Cough  oxycodone    5 mG/acetaminophen 325 mG 1 Tablet(s) Oral every 8 hours PRN Moderate Pain (4 - 6)      __________________________________________________  REVIEW OF SYSTEMS:    CONSTITUTIONAL: No fever,   RESPIRATORY: + intermittent  cough with  shortness of breath  CARDIOVASCULAR: no chest pain, no palpitations  GASTROINTESTINAL: No pain. No nausea or vomiting; No diarrhea   NEUROLOGICAL: No headache or numbness, no tremors  MUSCULOSKELETAL: No joint pain, no muscle pain  GENITOURINARY: no dysuria, no frequency, no hesitancy        Vital Signs Last 24 Hrs  T(C): 37.1 (28 Jun 2021 14:12), Max: 37.1 (28 Jun 2021 14:12)  T(F): 98.8 (28 Jun 2021 14:12), Max: 98.8 (28 Jun 2021 14:12)  HR: 86 (28 Jun 2021 14:12) (82 - 97)  BP: 156/94 (28 Jun 2021 14:12) (141/92 - 156/94)  BP(mean): --  RR: 20 (28 Jun 2021 14:12) (18 - 20)  SpO2: 97% (28 Jun 2021 14:12) (95% - 97%)    ________________________________________________  PHYSICAL EXAM:  GENERAL: NAD  HEENT: Normocephalic;  conjunctivae and sclerae clear; moist mucous membranes;   NECK : supple  CHEST/LUNG: absent breath sounds to left, decreased breath sounds to right   HEART: S1 S2  regular; no murmurs, gallops or rubs  ABDOMEN: Soft, Nontender, Nondistended; Bowel sounds present  EXTREMITIES: no cyanosis; no edema; no calf tenderness  SKIN: warm and dry; no rash  NERVOUS SYSTEM:  Awake and alert; Oriented  to place, person and time ; no new deficits    _________________________________________________  LABS:                        14.3   8.67  )-----------( 498      ( 28 Jun 2021 08:08 )             41.5     06-28    134<L>  |  101  |  14  ----------------------------<  88  4.1   |  21<L>  |  0.87    Ca    8.6      28 Jun 2021 08:08  Phos  2.7     06-28  Mg     2.5     06-28          CAPILLARY BLOOD GLUCOSE      POCT Blood Glucose.: 99 mg/dL (28 Jun 2021 11:49)  POCT Blood Glucose.: 78 mg/dL (28 Jun 2021 07:56)  POCT Blood Glucose.: 58 mg/dL (28 Jun 2021 07:54)  POCT Blood Glucose.: 97 mg/dL (28 Jun 2021 03:11)        RADIOLOGY & ADDITIONAL TESTS:    Imaging Personally Reviewed:  YES/NO    Consultant(s) Notes Reviewed:   YES/ No    Care Discussed with Consultants :     Plan of care was discussed with patient and /or primary care giver; all questions and concerns were addressed and care was aligned with patient's wishes.

## 2021-06-28 NOTE — PROGRESS NOTE ADULT - ASSESSMENT
· Assessment	  66 year old male, smoker, presented with cough, sob, and chest tightness for 2 weeks.  It is getting worse.  CT chest showed very large left effusion    1. large left effusion in a smoker  highly suspicious for malig  will do chest tube  because of right shift of mediastinum  he needs effusion to be drained right away if he develops sob  needs to be monitored closely  will check cytology of effusion    2. bone mets  will check CEA, PSA  CEA is high  more likely to be lung ca

## 2021-06-29 DIAGNOSIS — J90 PLEURAL EFFUSION, NOT ELSEWHERE CLASSIFIED: ICD-10-CM

## 2021-06-29 LAB
ANION GAP SERPL CALC-SCNC: 14 MMOL/L — SIGNIFICANT CHANGE UP (ref 5–17)
BUN SERPL-MCNC: 15 MG/DL — SIGNIFICANT CHANGE UP (ref 7–18)
CALCIUM SERPL-MCNC: 8.6 MG/DL — SIGNIFICANT CHANGE UP (ref 8.4–10.5)
CHLORIDE SERPL-SCNC: 105 MMOL/L — SIGNIFICANT CHANGE UP (ref 96–108)
CO2 SERPL-SCNC: 19 MMOL/L — LOW (ref 22–31)
CREAT SERPL-MCNC: 0.87 MG/DL — SIGNIFICANT CHANGE UP (ref 0.5–1.3)
GAMMA INTERFERON BACKGROUND BLD IA-ACNC: 0.24 IU/ML — SIGNIFICANT CHANGE UP
GLUCOSE SERPL-MCNC: 168 MG/DL — HIGH (ref 70–99)
GRAM STN FLD: SIGNIFICANT CHANGE UP
HCT VFR BLD CALC: 40.1 % — SIGNIFICANT CHANGE UP (ref 39–50)
HGB BLD-MCNC: 13.9 G/DL — SIGNIFICANT CHANGE UP (ref 13–17)
M TB IFN-G BLD-IMP: NEGATIVE — SIGNIFICANT CHANGE UP
M TB IFN-G CD4+ BCKGRND COR BLD-ACNC: -0.02 IU/ML — SIGNIFICANT CHANGE UP
M TB IFN-G CD4+CD8+ BCKGRND COR BLD-ACNC: -0.03 IU/ML — SIGNIFICANT CHANGE UP
MCHC RBC-ENTMCNC: 30.8 PG — SIGNIFICANT CHANGE UP (ref 27–34)
MCHC RBC-ENTMCNC: 34.7 GM/DL — SIGNIFICANT CHANGE UP (ref 32–36)
MCV RBC AUTO: 88.9 FL — SIGNIFICANT CHANGE UP (ref 80–100)
NIGHT BLUE STAIN TISS: SIGNIFICANT CHANGE UP
NRBC # BLD: 0 /100 WBCS — SIGNIFICANT CHANGE UP (ref 0–0)
PLATELET # BLD AUTO: 536 K/UL — HIGH (ref 150–400)
POTASSIUM SERPL-MCNC: 4.2 MMOL/L — SIGNIFICANT CHANGE UP (ref 3.5–5.3)
POTASSIUM SERPL-SCNC: 4.2 MMOL/L — SIGNIFICANT CHANGE UP (ref 3.5–5.3)
QUANT TB PLUS MITOGEN MINUS NIL: 0.59 IU/ML — SIGNIFICANT CHANGE UP
RBC # BLD: 4.51 M/UL — SIGNIFICANT CHANGE UP (ref 4.2–5.8)
RBC # FLD: 13.4 % — SIGNIFICANT CHANGE UP (ref 10.3–14.5)
SODIUM SERPL-SCNC: 138 MMOL/L — SIGNIFICANT CHANGE UP (ref 135–145)
SPECIMEN SOURCE: SIGNIFICANT CHANGE UP
SPECIMEN SOURCE: SIGNIFICANT CHANGE UP
WBC # BLD: 12.03 K/UL — HIGH (ref 3.8–10.5)
WBC # FLD AUTO: 12.03 K/UL — HIGH (ref 3.8–10.5)

## 2021-06-29 PROCEDURE — 71045 X-RAY EXAM CHEST 1 VIEW: CPT | Mod: 26

## 2021-06-29 RX ADMIN — AZITHROMYCIN 255 MILLIGRAM(S): 500 TABLET, FILM COATED ORAL at 18:10

## 2021-06-29 RX ADMIN — CEFTRIAXONE 100 MILLIGRAM(S): 500 INJECTION, POWDER, FOR SOLUTION INTRAMUSCULAR; INTRAVENOUS at 18:10

## 2021-06-29 RX ADMIN — Medication 100 MILLIGRAM(S): at 21:33

## 2021-06-29 RX ADMIN — Medication 1 PATCH: at 11:05

## 2021-06-29 RX ADMIN — Medication 1 PATCH: at 21:12

## 2021-06-29 NOTE — PROGRESS NOTE ADULT - PROBLEM SELECTOR PLAN 1
Malignancy vs PNA sp VATS with plurex cath 6/28   CT: Large left pleural effusion with associated atelectasis of the left lung   Associated right shift of the mediastinum.   continue Rocephin and Azithro empirically   Follow [facility] for TB   pulmonary Dr. Mendez on board   Dr. López hem/onc on board

## 2021-06-29 NOTE — PROGRESS NOTE ADULT - SUBJECTIVE AND OBJECTIVE BOX
NP Note discussed with  primary attending    Patient is a 66y old  Male who presents with a chief complaint of Cough and Dyspnea (29 Jun 2021 09:47)      INTERVAL HPI/OVERNIGHT EVENTS: no new complaints    MEDICATIONS  (STANDING):  azithromycin  IVPB      azithromycin  IVPB 500 milliGRAM(s) IV Intermittent every 24 hours  cefTRIAXone   IVPB 1000 milliGRAM(s) IV Intermittent every 24 hours  dextrose 5% + sodium chloride 0.9%. 1000 milliLiter(s) (75 mL/Hr) IV Continuous <Continuous>  nicotine -  14 mG/24Hr(s) Patch 1 patch Transdermal daily    MEDICATIONS  (PRN):  acetaminophen    Suspension .. 650 milliGRAM(s) Oral every 6 hours PRN Mild Pain (1 - 3)  ALBUTerol    90 MICROgram(s) HFA Inhaler 2 Puff(s) Inhalation every 6 hours PRN Shortness of Breath and/or Wheezing  guaiFENesin Oral Liquid (Sugar-Free) 100 milliGRAM(s) Oral every 6 hours PRN Cough  oxycodone    5 mG/acetaminophen 325 mG 1 Tablet(s) Oral every 8 hours PRN Moderate Pain (4 - 6)      __________________________________________________  REVIEW OF SYSTEMS:    CONSTITUTIONAL: No fever,   EYES: no acute visual disturbances  NECK: No pain or stiffness  RESPIRATORY: + intermittent cough and  shortness of breath  CARDIOVASCULAR: No chest pain, no palpitations  GASTROINTESTINAL: No pain. No nausea or vomiting; No diarrhea   NEUROLOGICAL: No headache or numbness, no tremors  MUSCULOSKELETAL: No joint pain, no muscle pain  GENITOURINARY: no dysuria, no frequency, no hesitancy        Vital Signs Last 24 Hrs  T(C): 36.1 (29 Jun 2021 05:00), Max: 37.1 (28 Jun 2021 14:12)  T(F): 97 (29 Jun 2021 05:00), Max: 98.8 (28 Jun 2021 14:12)  HR: 80 (29 Jun 2021 05:00) (77 - 86)  BP: 115/70 (29 Jun 2021 05:00) (99/75 - 156/94)  BP(mean): 93 (28 Jun 2021 19:00) (80 - 98)  RR: 19 (29 Jun 2021 05:00) (15 - 20)  SpO2: 97% (29 Jun 2021 05:00) (96% - 100%)    ________________________________________________  PHYSICAL EXAM:  GENERAL: NAD  CHEST/LUNG: decreased breath sounds carmel   left chest tube in place   HEART: S1 S2  regular;   ABDOMEN: Soft, Nontender, Nondistended; Bowel sounds present  EXTREMITIES: no cyanosis; no edema; no calf tenderness  SKIN: warm and dry; no rash  NERVOUS SYSTEM:  Awake and alert; Oriented  to place, person and time ; no new deficits    _________________________________________________  LABS:                        13.9   12.03 )-----------( 536      ( 29 Jun 2021 07:18 )             40.1     06-29    138  |  105  |  15  ----------------------------<  168<H>  4.2   |  19<L>  |  0.87    Ca    8.6      29 Jun 2021 07:18  Phos  2.7     06-28  Mg     2.5     06-28          CAPILLARY BLOOD GLUCOSE      POCT Blood Glucose.: 101 mg/dL (28 Jun 2021 19:17)  POCT Blood Glucose.: 99 mg/dL (28 Jun 2021 11:49)        RADIOLOGY & ADDITIONAL TESTS:    Imaging Personally Reviewed:  YES/NO    Consultant(s) Notes Reviewed:   YES/ No    Care Discussed with Consultants :     Plan of care was discussed with patient and /or primary care giver; all questions and concerns were addressed and care was aligned with patient's wishes.

## 2021-06-29 NOTE — PROGRESS NOTE ADULT - ASSESSMENT
66 Y M POD # 1 from left sided VATS, pleural biopsy, and Pleurx Catheter insertion   -Continue pleurx catheter to water seal  -please obtain AM chest x-ray  -Pleurx catheter to be capped pending x-ray  -okay to restart dvt px   -Case management for drainage of catheter three times a week at home   -follow up cytology results and pleural biopsy   -surgery to follow while in house

## 2021-06-29 NOTE — PROGRESS NOTE ADULT - SUBJECTIVE AND OBJECTIVE BOX
Thoracic surgery progress note    66 Y M with large left sided pleural effusion admitted to the medical service on 06/25 for shortness of breath and cough. Patient is currently POD # 1 from left sided VATS with pleural biopsy and Pleurx catheter insertion. Patient doing well post operatively, no acute complaints at this time. Pain is well controlled. Tolerating diet, no nausea or vomiting. Pleurx catheter to water seal, output is 200 of serosanguinous fluid.  Frozen biopsy was performed of lesion that appeared concerning for malignancy, as per pathology suspicious of malignancy however tissue was to edematous and necrotic to say for certain.     Vital Signs Last 24 Hrs  T(C): 36.1 (29 Jun 2021 05:00), Max: 37.1 (28 Jun 2021 14:12)  T(F): 97 (29 Jun 2021 05:00), Max: 98.8 (28 Jun 2021 14:12)  HR: 80 (29 Jun 2021 05:00) (77 - 86)  BP: 115/70 (29 Jun 2021 05:00) (99/75 - 156/94)  BP(mean): 93 (28 Jun 2021 19:00) (80 - 98)  RR: 19 (29 Jun 2021 05:00) (15 - 20)  SpO2: 97% (29 Jun 2021 05:00) (96% - 100%)  no acute distress, AAOX3   non labored breathing, saturating well on room air , left sided pleurx catheter dressing is dry and intact, 200 cc of serosanguinous output since insertion  abd is soft, non tender, non distended   Moving all 4 extremities   skin is well perfused                           13.9   12.03 )-----------( 536      ( 29 Jun 2021 07:18 )             40.1 06-29    138  |  105  |  15  ----------------------------<  168<H>  4.2   |  19<L>  |  0.87    Ca    8.6      29 Jun 2021 07:18  Phos  2.7     06-28  Mg     2.5     06-28

## 2021-06-29 NOTE — PROGRESS NOTE ADULT - ASSESSMENT
65 y/o male with a PMH of HTN, HLD, smoker  presents to the ED with c/o SOB, cough and chest tightness for the past 2 weeks. Also reported  night sweats and chills for one week   CT Chest w/ IV Cont Large left pleural effusion with associated atelectasis of the left lung as described above. Associated rightshift of the mediastinum. Mildly enlarged subcarinal lymph node.  Admitted for pleural effusion ro malignancy, ro TB   Pt seen by thoracic surgery, underwent  left VATS with plurex cath 6/28  Pt seen at bedside, NAD, co intermittent SOB, no chest pain, plurex cath in place, draining serousanguinous output (400 ml overnight ) pending interferon gold   No fever, chills.

## 2021-06-29 NOTE — PROGRESS NOTE ADULT - SUBJECTIVE AND OBJECTIVE BOX
Time of Visit:  Patient seen and examined.     MEDICATIONS  (STANDING):  azithromycin  IVPB 500 milliGRAM(s) IV Intermittent every 24 hours  azithromycin  IVPB      cefTRIAXone   IVPB 1000 milliGRAM(s) IV Intermittent every 24 hours  dextrose 5% + sodium chloride 0.9%. 1000 milliLiter(s) (75 mL/Hr) IV Continuous <Continuous>  nicotine -  14 mG/24Hr(s) Patch 1 patch Transdermal daily      MEDICATIONS  (PRN):  acetaminophen    Suspension .. 650 milliGRAM(s) Oral every 6 hours PRN Mild Pain (1 - 3)  ALBUTerol    90 MICROgram(s) HFA Inhaler 2 Puff(s) Inhalation every 6 hours PRN Shortness of Breath and/or Wheezing  guaiFENesin Oral Liquid (Sugar-Free) 100 milliGRAM(s) Oral every 6 hours PRN Cough  oxycodone    5 mG/acetaminophen 325 mG 1 Tablet(s) Oral every 8 hours PRN Moderate Pain (4 - 6)       Medications up to date at time of exam.      PHYSICAL EXAMINATION:  Patient has no new complaints.  GENERAL: The patient is a well-developed, well-nourished, in no apparent distress.     Vital Signs Last 24 Hrs  T(C): 36.8 (29 Jun 2021 14:09), Max: 36.8 (29 Jun 2021 14:09)  T(F): 98.3 (29 Jun 2021 14:09), Max: 98.3 (29 Jun 2021 14:09)  HR: 73 (29 Jun 2021 14:09) (73 - 86)  BP: 117/73 (29 Jun 2021 14:09) (99/75 - 133/84)  BP(mean): 93 (28 Jun 2021 19:00) (80 - 98)  RR: 20 (29 Jun 2021 14:09) (15 - 20)  SpO2: 96% (29 Jun 2021 14:09) (96% - 100%)   (if applicable)    Chest Tube (if applicable)    HEENT: Head is normocephalic and atraumatic. Extraocular muscles are intact. Mucous membranes are moist.     NECK: Supple, no palpable adenopathy.    LUNGS: Fair b/l beath sounds. Left pleuX cath in position    HEART: Regular rate and rhythm without murmur.    ABDOMEN: Soft, nontender, and nondistended.  No hepatosplenomegaly is noted.    : No painful voiding, no pelvic pain    EXTREMITIES: Without any cyanosis, clubbing, rash, lesions or edema.    NEUROLOGIC: Awake, alert, oriented, grossly intact    SKIN: Warm, dry, good turgor.      LABS:                        13.9   12.03 )-----------( 536      ( 29 Jun 2021 07:18 )             40.1     06-29    138  |  105  |  15  ----------------------------<  168<H>  4.2   |  19<L>  |  0.87    Ca    8.6      29 Jun 2021 07:18  Phos  2.7     06-28  Mg     2.5     06-28                          MICROBIOLOGY: (if applicable)    RADIOLOGY & ADDITIONAL STUDIES:  EKG:   CXR: 6/29 .. Small Left PTX  ECHO:    IMPRESSION: 66y Male PAST MEDICAL & SURGICAL HISTORY:  HLD (hyperlipidemia)    HTN (hypertension)    Smoking     p/w          IMP: This is a66 yr old  man , active   smoker.  Presented with dyspnea and cough due to massive left pleural effusion completely  filling  left hemithorax with out contralateral mediastinal shift . There is suspicion of underlying mass.  Patient  is not in respiratory  distress .  06-27-21, 06-25-21 Negative for Covid 19 PCR.       Plan   -S/P VATS with PleurX with placement  -repeat CXR to evaluate PTX   -f/u pleural studies  -f/u cytology  -Air borne isolation   -Sputum for AFB x 3  -Reinforced the importance of smoking cessation.

## 2021-06-29 NOTE — PROGRESS NOTE ADULT - ASSESSMENT
· Assessment	  66 year old male, smoker, presented with cough, sob, and chest tightness for 2 weeks.  It is getting worse.  CT chest showed very large left effusion    1. large left effusion in a smoker  highly suspicious for malig  will do chest tube  because of right shift of mediastinum  had VATS  needs to be monitored closely  will check cytology of effusion    2. bone mets  will check CEA, PSA  CEA is high  more likely to be lung ca

## 2021-06-29 NOTE — PROGRESS NOTE ADULT - SUBJECTIVE AND OBJECTIVE BOX
had vats  serosanguinous fluid  cough+  no bleeding    MEDICATIONS  (STANDING):  azithromycin  IVPB      azithromycin  IVPB 500 milliGRAM(s) IV Intermittent every 24 hours  cefTRIAXone   IVPB 1000 milliGRAM(s) IV Intermittent every 24 hours  dextrose 5% + sodium chloride 0.9%. 1000 milliLiter(s) (75 mL/Hr) IV Continuous <Continuous>  nicotine -  14 mG/24Hr(s) Patch 1 patch Transdermal daily    MEDICATIONS  (PRN):  acetaminophen    Suspension .. 650 milliGRAM(s) Oral every 6 hours PRN Mild Pain (1 - 3)  ALBUTerol    90 MICROgram(s) HFA Inhaler 2 Puff(s) Inhalation every 6 hours PRN Shortness of Breath and/or Wheezing  guaiFENesin Oral Liquid (Sugar-Free) 100 milliGRAM(s) Oral every 6 hours PRN Cough  oxycodone    5 mG/acetaminophen 325 mG 1 Tablet(s) Oral every 8 hours PRN Moderate Pain (4 - 6)      Allergies    No Known Allergies    Intolerances        Vital Signs Last 24 Hrs  T(C): 36.1 (29 Jun 2021 05:00), Max: 37.1 (28 Jun 2021 14:12)  T(F): 97 (29 Jun 2021 05:00), Max: 98.8 (28 Jun 2021 14:12)  HR: 80 (29 Jun 2021 05:00) (77 - 86)  BP: 115/70 (29 Jun 2021 05:00) (99/75 - 156/94)  BP(mean): 93 (28 Jun 2021 19:00) (80 - 98)  RR: 19 (29 Jun 2021 05:00) (15 - 20)  SpO2: 97% (29 Jun 2021 05:00) (96% - 100%)    PHYSICAL EXAM  General: adult in NAD  HEENT: clear oropharynx, anicteric sclera, pink conjunctiva  Neck: supple  CV: normal S1/S2 with no murmur rubs or gallops  Lungs: positive air movement b/l ant lungs,clear to auscultation, no wheezes, no rales  Abdomen: soft non-tender non-distended, no hepatosplenomegaly  Ext: no clubbing cyanosis or edema  Skin: no rashes and no petechiae  Neuro: alert and oriented X 4, no focal deficits  LABS:                          13.9   12.03 )-----------( 536      ( 29 Jun 2021 07:18 )             40.1         Mean Cell Volume : 88.9 fl  Mean Cell Hemoglobin : 30.8 pg  Mean Cell Hemoglobin Concentration : 34.7 gm/dL  Auto Neutrophil # : x  Auto Lymphocyte # : x  Auto Monocyte # : x  Auto Eosinophil # : x  Auto Basophil # : x  Auto Neutrophil % : x  Auto Lymphocyte % : x  Auto Monocyte % : x  Auto Eosinophil % : x  Auto Basophil % : x    Serial CBC  Hematocrit 40.1  Hemoglobin 13.9  Plat 536  RBC 4.51  WBC 12.03  Serial CBC  Hematocrit 41.5  Hemoglobin 14.3  Plat 498  RBC 4.69  WBC 8.67  Serial CBC  Hematocrit 42.0  Hemoglobin 14.1  Plat 509  RBC 4.71  WBC 8.84  Serial CBC  Hematocrit 40.3  Hemoglobin 13.6  Plat 233  RBC 4.54  WBC 9.39  Serial CBC  Hematocrit 43.8  Hemoglobin 14.6  Plat 453  RBC 4.87  WBC 9.34    06-29    138  |  105  |  15  ----------------------------<  168<H>  4.2   |  19<L>  |  0.87    Ca    8.6      29 Jun 2021 07:18  Phos  2.7     06-28  Mg     2.5     06-28                      BLOOD SMEAR INTERPRETATION:       RADIOLOGY & ADDITIONAL STUDIES:

## 2021-06-30 LAB
ANION GAP SERPL CALC-SCNC: 12 MMOL/L — SIGNIFICANT CHANGE UP (ref 5–17)
BUN SERPL-MCNC: 11 MG/DL — SIGNIFICANT CHANGE UP (ref 7–18)
CALCIUM SERPL-MCNC: 8.4 MG/DL — SIGNIFICANT CHANGE UP (ref 8.4–10.5)
CHLORIDE SERPL-SCNC: 102 MMOL/L — SIGNIFICANT CHANGE UP (ref 96–108)
CO2 SERPL-SCNC: 21 MMOL/L — LOW (ref 22–31)
CREAT SERPL-MCNC: 0.84 MG/DL — SIGNIFICANT CHANGE UP (ref 0.5–1.3)
GLUCOSE SERPL-MCNC: 92 MG/DL — SIGNIFICANT CHANGE UP (ref 70–99)
HCT VFR BLD CALC: 42.4 % — SIGNIFICANT CHANGE UP (ref 39–50)
HGB BLD-MCNC: 14.4 G/DL — SIGNIFICANT CHANGE UP (ref 13–17)
MCHC RBC-ENTMCNC: 29.9 PG — SIGNIFICANT CHANGE UP (ref 27–34)
MCHC RBC-ENTMCNC: 34 GM/DL — SIGNIFICANT CHANGE UP (ref 32–36)
MCV RBC AUTO: 88 FL — SIGNIFICANT CHANGE UP (ref 80–100)
MRSA PCR RESULT.: SIGNIFICANT CHANGE UP
NRBC # BLD: 0 /100 WBCS — SIGNIFICANT CHANGE UP (ref 0–0)
PLATELET # BLD AUTO: 556 K/UL — HIGH (ref 150–400)
POTASSIUM SERPL-MCNC: 4.2 MMOL/L — SIGNIFICANT CHANGE UP (ref 3.5–5.3)
POTASSIUM SERPL-SCNC: 4.2 MMOL/L — SIGNIFICANT CHANGE UP (ref 3.5–5.3)
RBC # BLD: 4.82 M/UL — SIGNIFICANT CHANGE UP (ref 4.2–5.8)
RBC # FLD: 13.2 % — SIGNIFICANT CHANGE UP (ref 10.3–14.5)
S AUREUS DNA NOSE QL NAA+PROBE: SIGNIFICANT CHANGE UP
SODIUM SERPL-SCNC: 135 MMOL/L — SIGNIFICANT CHANGE UP (ref 135–145)
WBC # BLD: 8.77 K/UL — SIGNIFICANT CHANGE UP (ref 3.8–10.5)
WBC # FLD AUTO: 8.77 K/UL — SIGNIFICANT CHANGE UP (ref 3.8–10.5)

## 2021-06-30 PROCEDURE — 71045 X-RAY EXAM CHEST 1 VIEW: CPT | Mod: 26

## 2021-06-30 RX ADMIN — AZITHROMYCIN 255 MILLIGRAM(S): 500 TABLET, FILM COATED ORAL at 17:58

## 2021-06-30 RX ADMIN — Medication 1 PATCH: at 11:23

## 2021-06-30 RX ADMIN — Medication 1 PATCH: at 11:20

## 2021-06-30 RX ADMIN — Medication 100 MILLIGRAM(S): at 11:25

## 2021-06-30 RX ADMIN — Medication 1 PATCH: at 20:14

## 2021-06-30 RX ADMIN — CEFTRIAXONE 100 MILLIGRAM(S): 500 INJECTION, POWDER, FOR SOLUTION INTRAMUSCULAR; INTRAVENOUS at 17:53

## 2021-06-30 RX ADMIN — Medication 1 PATCH: at 07:14

## 2021-06-30 NOTE — PROGRESS NOTE ADULT - ASSESSMENT
66 Y M POD # 2 from left sided VATS with pleural biopsy and Pleurx catheter insertion  -obtain repeat chest x-ray s/p capping of pleurx catheter  -VNS set up for drainage of pleurx catheter 3x a week  -f/u cytology  -f/u biopsy results   -OOBTC and incentive spirometry

## 2021-06-30 NOTE — PROGRESS NOTE ADULT - SUBJECTIVE AND OBJECTIVE BOX
feel better  no sob  less cough      MEDICATIONS  (STANDING):  azithromycin  IVPB 500 milliGRAM(s) IV Intermittent every 24 hours  azithromycin  IVPB      cefTRIAXone   IVPB 1000 milliGRAM(s) IV Intermittent every 24 hours  nicotine -  14 mG/24Hr(s) Patch 1 patch Transdermal daily    MEDICATIONS  (PRN):  acetaminophen    Suspension .. 650 milliGRAM(s) Oral every 6 hours PRN Mild Pain (1 - 3)  ALBUTerol    90 MICROgram(s) HFA Inhaler 2 Puff(s) Inhalation every 6 hours PRN Shortness of Breath and/or Wheezing  guaiFENesin Oral Liquid (Sugar-Free) 100 milliGRAM(s) Oral every 6 hours PRN Cough  oxycodone    5 mG/acetaminophen 325 mG 1 Tablet(s) Oral every 8 hours PRN Moderate Pain (4 - 6)      Allergies    No Known Allergies    Intolerances        Vital Signs Last 24 Hrs  T(C): 36.7 (30 Jun 2021 05:28), Max: 36.9 (29 Jun 2021 20:11)  T(F): 98 (30 Jun 2021 05:28), Max: 98.4 (29 Jun 2021 20:11)  HR: 80 (30 Jun 2021 05:28) (73 - 86)  BP: 114/70 (30 Jun 2021 05:28) (108/71 - 125/75)  BP(mean): --  RR: 20 (30 Jun 2021 05:28) (16 - 20)  SpO2: 96% (30 Jun 2021 05:28) (95% - 97%)    PHYSICAL EXAM  General: adult in NAD  HEENT: clear oropharynx, anicteric sclera, pink conjunctiva  Neck: supple  CV: normal S1/S2 with no murmur rubs or gallops  Lungs: positive air movement b/l ant lungs,clear to auscultation, no wheezes, no rales  Abdomen: soft non-tender non-distended, no hepatosplenomegaly  Ext: no clubbing cyanosis or edema  Skin: no rashes and no petechiae  Neuro: alert and oriented X 4, no focal deficits  LABS:                          14.4   8.77  )-----------( 556      ( 30 Jun 2021 07:36 )             42.4         Mean Cell Volume : 88.0 fl  Mean Cell Hemoglobin : 29.9 pg  Mean Cell Hemoglobin Concentration : 34.0 gm/dL  Auto Neutrophil # : x  Auto Lymphocyte # : x  Auto Monocyte # : x  Auto Eosinophil # : x  Auto Basophil # : x  Auto Neutrophil % : x  Auto Lymphocyte % : x  Auto Monocyte % : x  Auto Eosinophil % : x  Auto Basophil % : x    Serial CBC  Hematocrit 42.4  Hemoglobin 14.4  Plat 556  RBC 4.82  WBC 8.77  Serial CBC  Hematocrit 40.1  Hemoglobin 13.9  Plat 536  RBC 4.51  WBC 12.03  Serial CBC  Hematocrit 41.5  Hemoglobin 14.3  Plat 498  RBC 4.69  WBC 8.67  Serial CBC  Hematocrit 42.0  Hemoglobin 14.1  Plat 509  RBC 4.71  WBC 8.84    06-30    135  |  102  |  11  ----------------------------<  92  4.2   |  21<L>  |  0.84    Ca    8.4      30 Jun 2021 07:36                      BLOOD SMEAR INTERPRETATION:       RADIOLOGY & ADDITIONAL STUDIES:

## 2021-06-30 NOTE — PROGRESS NOTE ADULT - PROBLEM SELECTOR PLAN 1
Malignancy vs PNA sp VATS with plurex cath 6/28   Repeat chest x-ray  showed  Left pneumothorax  largely filled in with fluid.  Chest tube  to pleur evac to gravity   continue Rocephin and Azithro empirically   Follow up AFB x 3 (#1 is pending results)   pulmonary Dr. Mendez on board   Dr. López hem/onc on board  thoracic surgery on board

## 2021-06-30 NOTE — PROGRESS NOTE ADULT - ASSESSMENT
67 y/o male with a PMH of HTN, HLD, smoker  presents to the ED with c/o SOB, cough and chest tightness for the past 2 weeks. Also reported  night sweats and chills for one week   CT Chest w/ IV Cont Large left pleural effusion with associated atelectasis of the left lung as described above. Associated rightshift of the mediastinum. Mildly enlarged subcarinal lymph node.  Admitted for pleural effusion ro malignancy, ro TB   Pt seen by thoracic surgery, underwent  left VATS with plurex cath 6/28  This morning plurex cath was capped by surgery. Repeat chest x-ray  showed  Left pneumothorax  largely filled in with fluid.  Chest tube was reconnected to pleur evac to gravity   Pt was seen at bedside, NAD, breathing easily, no chest pain, no fever/chills   AFB x 3 requested by pulmonologist. AFB #1 sent

## 2021-06-30 NOTE — PROGRESS NOTE ADULT - SUBJECTIVE AND OBJECTIVE BOX
Thoracic surgery follow up note     66 Y M with large left sided pleural effusion admitted to the medical service on 06/25 for shortness of breath and cough. Patient is currently POD # 2 from left sided VATS with pleural biopsy and Pleurx catheter insertion. Patient doing well post operatively, no acute complaints at this time. Pain is well controlled. Tolerating diet, no nausea or vomiting. Has been hemodynamically stable. Post pleurx catheter insertion shows improvement of pleural effusion on chest x-ray. Pleurx catheter was capped yesterday evening.     Vital Signs Last 24 Hrs  T(C): 36.7 (30 Jun 2021 05:28), Max: 36.9 (29 Jun 2021 20:11)  T(F): 98 (30 Jun 2021 05:28), Max: 98.4 (29 Jun 2021 20:11)  HR: 80 (30 Jun 2021 05:28) (73 - 86)  BP: 114/70 (30 Jun 2021 05:28) (108/71 - 125/75)  BP(mean): --  RR: 20 (30 Jun 2021 05:28) (16 - 20)  SpO2: 96% (30 Jun 2021 05:28) (95% - 97%)  no acute distress, AAOX3   non labored breathing saturating well on room air, left sided pleurx dressing is dry and intact. Pleurx catheter is capped  abd is soft, non tender, non distended, +BS  Moving all 4 extremities  skin is well perfused

## 2021-06-30 NOTE — PROGRESS NOTE ADULT - SUBJECTIVE AND OBJECTIVE BOX
NP Note discussed with  primary attending    Patient is a 66y old  Male who presents with a chief complaint of Cough and Dyspnea (30 Jun 2021 08:54)      INTERVAL HPI/OVERNIGHT EVENTS: no new complaints    MEDICATIONS  (STANDING):  azithromycin  IVPB 500 milliGRAM(s) IV Intermittent every 24 hours  azithromycin  IVPB      cefTRIAXone   IVPB 1000 milliGRAM(s) IV Intermittent every 24 hours  nicotine -  14 mG/24Hr(s) Patch 1 patch Transdermal daily    MEDICATIONS  (PRN):  acetaminophen    Suspension .. 650 milliGRAM(s) Oral every 6 hours PRN Mild Pain (1 - 3)  ALBUTerol    90 MICROgram(s) HFA Inhaler 2 Puff(s) Inhalation every 6 hours PRN Shortness of Breath and/or Wheezing  guaiFENesin Oral Liquid (Sugar-Free) 100 milliGRAM(s) Oral every 6 hours PRN Cough  oxycodone    5 mG/acetaminophen 325 mG 1 Tablet(s) Oral every 8 hours PRN Moderate Pain (4 - 6)      __________________________________________________  REVIEW OF SYSTEMS:    CONSTITUTIONAL: No fever,   RESPIRATORY: + intermittent shortness of breath  CARDIOVASCULAR: No chest pain, no palpitations  GASTROINTESTINAL: No pain. No nausea or vomiting; No diarrhea   NEUROLOGICAL: No headache or numbness, no tremors  MUSCULOSKELETAL: No joint pain, no muscle pain  GENITOURINARY: no dysuria, no frequency, no hesitancy        Vital Signs Last 24 Hrs  T(C): 36.6 (30 Jun 2021 14:13), Max: 36.9 (29 Jun 2021 20:11)  T(F): 97.8 (30 Jun 2021 14:13), Max: 98.4 (29 Jun 2021 20:11)  HR: 90 (30 Jun 2021 14:13) (78 - 90)  BP: 141/84 (30 Jun 2021 14:13) (114/70 - 141/84)  BP(mean): --  RR: 18 (30 Jun 2021 14:13) (16 - 20)  SpO2: 97% (30 Jun 2021 14:13) (95% - 97%)    ________________________________________________  PHYSICAL EXAM:  GENERAL: NAD  CHEST/LUNG: decreased breath sounds to left   CHest tube to gravity   HEART: S1 S2  regular; no murmurs, gallops or rubs  ABDOMEN: Soft, Nontender, Nondistended; Bowel sounds present  EXTREMITIES: no cyanosis; no edema; no calf tenderness  SKIN: warm and dry; no rash  NERVOUS SYSTEM:  Awake and alert; Oriented  to place, person and time ; no new deficits    _________________________________________________  LABS:                        14.4   8.77  )-----------( 556      ( 30 Jun 2021 07:36 )             42.4     06-30    135  |  102  |  11  ----------------------------<  92  4.2   |  21<L>  |  0.84    Ca    8.4      30 Jun 2021 07:36          CAPILLARY BLOOD GLUCOSE            RADIOLOGY & ADDITIONAL TESTS:    Imaging Personally Reviewed:  YES/NO    Consultant(s) Notes Reviewed:   YES/ No    Care Discussed with Consultants :     Plan of care was discussed with patient and /or primary care giver; all questions and concerns were addressed and care was aligned with patient's wishes.

## 2021-07-01 LAB
ANION GAP SERPL CALC-SCNC: 10 MMOL/L — SIGNIFICANT CHANGE UP (ref 5–17)
BUN SERPL-MCNC: 11 MG/DL — SIGNIFICANT CHANGE UP (ref 7–18)
CALCIUM SERPL-MCNC: 8.7 MG/DL — SIGNIFICANT CHANGE UP (ref 8.4–10.5)
CHLORIDE SERPL-SCNC: 103 MMOL/L — SIGNIFICANT CHANGE UP (ref 96–108)
CO2 SERPL-SCNC: 22 MMOL/L — SIGNIFICANT CHANGE UP (ref 22–31)
CREAT SERPL-MCNC: 0.84 MG/DL — SIGNIFICANT CHANGE UP (ref 0.5–1.3)
CULTURE RESULTS: SIGNIFICANT CHANGE UP
GLUCOSE SERPL-MCNC: 97 MG/DL — SIGNIFICANT CHANGE UP (ref 70–99)
HCT VFR BLD CALC: 44.2 % — SIGNIFICANT CHANGE UP (ref 39–50)
HGB BLD-MCNC: 15.1 G/DL — SIGNIFICANT CHANGE UP (ref 13–17)
MCHC RBC-ENTMCNC: 30.2 PG — SIGNIFICANT CHANGE UP (ref 27–34)
MCHC RBC-ENTMCNC: 34.2 GM/DL — SIGNIFICANT CHANGE UP (ref 32–36)
MCV RBC AUTO: 88.4 FL — SIGNIFICANT CHANGE UP (ref 80–100)
NIGHT BLUE STAIN TISS: SIGNIFICANT CHANGE UP
NIGHT BLUE STAIN TISS: SIGNIFICANT CHANGE UP
NRBC # BLD: 0 /100 WBCS — SIGNIFICANT CHANGE UP (ref 0–0)
PLATELET # BLD AUTO: 585 K/UL — HIGH (ref 150–400)
POTASSIUM SERPL-MCNC: 4.2 MMOL/L — SIGNIFICANT CHANGE UP (ref 3.5–5.3)
POTASSIUM SERPL-SCNC: 4.2 MMOL/L — SIGNIFICANT CHANGE UP (ref 3.5–5.3)
RBC # BLD: 5 M/UL — SIGNIFICANT CHANGE UP (ref 4.2–5.8)
RBC # FLD: 13.3 % — SIGNIFICANT CHANGE UP (ref 10.3–14.5)
SODIUM SERPL-SCNC: 135 MMOL/L — SIGNIFICANT CHANGE UP (ref 135–145)
SPECIMEN SOURCE: SIGNIFICANT CHANGE UP
WBC # BLD: 8.58 K/UL — SIGNIFICANT CHANGE UP (ref 3.8–10.5)
WBC # FLD AUTO: 8.58 K/UL — SIGNIFICANT CHANGE UP (ref 3.8–10.5)

## 2021-07-01 PROCEDURE — 71045 X-RAY EXAM CHEST 1 VIEW: CPT | Mod: 26,76

## 2021-07-01 RX ORDER — OXYCODONE HYDROCHLORIDE 5 MG/1
1 TABLET ORAL
Qty: 8 | Refills: 0
Start: 2021-07-01 | End: 2021-07-02

## 2021-07-01 RX ADMIN — Medication 1 PATCH: at 19:09

## 2021-07-01 RX ADMIN — Medication 100 MILLIGRAM(S): at 10:57

## 2021-07-01 RX ADMIN — CEFTRIAXONE 100 MILLIGRAM(S): 500 INJECTION, POWDER, FOR SOLUTION INTRAMUSCULAR; INTRAVENOUS at 17:21

## 2021-07-01 RX ADMIN — AZITHROMYCIN 255 MILLIGRAM(S): 500 TABLET, FILM COATED ORAL at 19:06

## 2021-07-01 RX ADMIN — Medication 1 PATCH: at 11:01

## 2021-07-01 RX ADMIN — Medication 1 PATCH: at 08:01

## 2021-07-01 NOTE — PROGRESS NOTE ADULT - PROBLEM SELECTOR PLAN 1
Malignancy vs PNA sp VATS with plurex cath 6/28   Repeat chest x-ray  showed  Left pneumothorax  largely filled in with fluid.  Chest tube  to pleur evac to suction    continue Rocephin and Azithro empirically   Follow up AFB x 3 (AFB #1 negative, #2 is pending results)   pulmonary Dr. Mendez on board   Dr. López hem/onc on board  thoracic surgery on board  follow up repeat CXR to monitor status of pneumothorax

## 2021-07-01 NOTE — PROGRESS NOTE ADULT - ASSESSMENT
A/P: 66M POD3 s/p left-sided VATS, pleural biopsy, and Pleurx catheter placement  -f/u AM CXR, maintain water seal pending results  -VNS setup for intermittent drainage of Pleurx catheter 3x/week upon discharge  -f/u surgical pathology results  -out of bed to chair, incentive spirometry

## 2021-07-01 NOTE — PROGRESS NOTE ADULT - ASSESSMENT
67 y/o male with a PMH of HTN, HLD, smoker  presents to the ED with c/o SOB, cough and chest tightness for the past 2 weeks. Also reported  night sweats and chills for one week   CT Chest w/ IV Cont Large left pleural effusion with associated atelectasis of the left lung as described above. Associated rightshift of the mediastinum. Mildly enlarged subcarinal lymph node.  Admitted for pleural effusion ro malignancy, ro TB   Pt seen by thoracic surgery, underwent  left VATS with plurex cath 6/28  On 6/30  plurex cath was capped by surgery. Repeat chest x-ray  showed  Left pneumothorax  largely filled in with fluid.  Chest tube was reconnected to pleur evac to suction. Pending pleural biopsy results    Pt was seen at bedside, NAD, breathing easily, co mild pain to chest tube insertion site 4/10, declining pain medications, no fever/chills   AFB x 1 negative, AFB #2 testing. CXR to be repeated today. Thoracic surgery follow.

## 2021-07-01 NOTE — PROGRESS NOTE ADULT - SUBJECTIVE AND OBJECTIVE BOX
66M admitted to medicine service on 6/25 for cough and dyspnea, was found to have large left-sided pleural effusion, concerning for malignancy, for which thoracic surgery was consulted. Now POD3 s/p left-sided VATS, pleural biopsy, and placement of Pleurx catheter. Catheter was capped yesterday, but repeat CXR demonstrated worsening left-sided pleural effusion and pneumothorax, and so catheter was placed back on water seal. Patient states that he has mild pain around catheter site particularly when he coughs, but no other acute complaints at this time.    Physical Exam:  Vitals: 96.9 temp, 81 HR, 109/72 BP, 18 RR, 94% O2 on room air  General: no acute distress, aware, alert, and oriented x3  Cardio: regular rate and rhythm  Resp: nonlabored breathing, left sided Pleurx catheter dressing site clean dry and intact, mild and appropriate tenderness around catheter insertion site, catheter on water seal with 12 ml of serous fluid in collection chamber  Skin: warm, well perfused    I/O: Pleurx catheter with output of 12 ml of serous fluid since being placed on water seal yesterday afternoon    Labs: 8.5 WBC, 15.1 Hgb, 585 platelet, electrolytes WNL, 11/0.8 BUN/Cr    Imaging: CXR pending    Pathology: Cytology returned 6/30 negative for malignant cells. Surgical pathology pending. 66M admitted to medicine service on 6/25 for cough and dyspnea, was found to have large left-sided pleural effusion, concerning for malignancy, for which thoracic surgery was consulted. Now POD3 s/p left-sided VATS, pleural biopsy, and placement of Pleurx catheter. Catheter was capped yesterday, but repeat CXR demonstrated worsening left-sided pleural effusion and pneumothorax, and so catheter was placed back on water seal. Patient states that he has mild pain around catheter site particularly when he coughs, but no other acute complaints at this time.    Physical Exam:  Vitals: 96.9 temp, 81 HR, 109/72 BP, 18 RR, 94% O2 on room air  General: no acute distress, awake, alert, and oriented x3  Cardio: regular rate and rhythm  Resp: nonlabored breathing, left sided Pleurx catheter dressing site clean dry and intact, mild and appropriate tenderness around catheter insertion site, catheter on water seal with 12 ml of serous fluid in collection chamber  Skin: warm, well perfused    I/O: Pleurx catheter with output of 12 ml of serous fluid since being placed on water seal yesterday afternoon    Labs: 8.5 WBC, 15.1 Hgb, 585 platelet, electrolytes WNL, 11/0.8 BUN/Cr    Imaging: CXR pending    Pathology: Cytology returned 6/30 negative for malignant cells. Surgical pathology pending. 66M admitted to medicine service on 6/25 for cough and dyspnea, was found to have large left-sided pleural effusion, concerning for malignancy, for which thoracic surgery was consulted. Now POD3 s/p left-sided VATS, pleural biopsy, and placement of Pleurx catheter. Catheter was capped yesterday, but repeat CXR demonstrated worsening left-sided pleural effusion and pneumothorax, and so catheter was placed back on water seal. Patient states that he has mild pain around catheter site particularly when he coughs, but no other acute complaints at this time. Ambulating and tolerating diet.    Physical Exam:  Vitals: 96.9 temp, 81 HR, 109/72 BP, 18 RR, 94% O2 on room air  General: no acute distress, awake, alert, and oriented x3  Cardio: regular rate and rhythm  Resp: nonlabored breathing, left sided Pleurx catheter dressing site clean dry and intact, mild and appropriate tenderness around catheter insertion site, catheter on water seal with 12 ml of serous fluid in collection chamber  Skin: warm, well perfused    I/O: Pleurx catheter with output of 12 ml of serous fluid since being placed on water seal yesterday afternoon    Labs: 8.5 WBC, 15.1 Hgb, 585 platelet, electrolytes WNL, 11/0.8 BUN/Cr    Imaging: CXR pending    Pathology: Cytology returned 6/30 negative for malignant cells. Surgical pathology pending.

## 2021-07-01 NOTE — PROGRESS NOTE ADULT - SUBJECTIVE AND OBJECTIVE BOX
Time of Visit:  Patient seen and examined.     MEDICATIONS  (STANDING):  azithromycin  IVPB 500 milliGRAM(s) IV Intermittent every 24 hours  azithromycin  IVPB      cefTRIAXone   IVPB 1000 milliGRAM(s) IV Intermittent every 24 hours  nicotine -  14 mG/24Hr(s) Patch 1 patch Transdermal daily      MEDICATIONS  (PRN):  acetaminophen    Suspension .. 650 milliGRAM(s) Oral every 6 hours PRN Mild Pain (1 - 3)  ALBUTerol    90 MICROgram(s) HFA Inhaler 2 Puff(s) Inhalation every 6 hours PRN Shortness of Breath and/or Wheezing  guaiFENesin Oral Liquid (Sugar-Free) 100 milliGRAM(s) Oral every 6 hours PRN Cough  oxycodone    5 mG/acetaminophen 325 mG 1 Tablet(s) Oral every 8 hours PRN Moderate Pain (4 - 6)       Medications up to date at time of exam.    ROS; No fever, chills, cough, congestion. Denies SOB on exam.   PHYSICAL EXAMINATION:  Vital Signs Last 24 Hrs  T(C): 36.1 (01 Jul 2021 05:17), Max: 36.6 (30 Jun 2021 14:13)  T(F): 96.9 (01 Jul 2021 05:17), Max: 97.8 (30 Jun 2021 14:13)  HR: 81 (01 Jul 2021 05:17) (81 - 90)  BP: 109/72 (01 Jul 2021 05:17) (109/72 - 141/84)  BP(mean): --  RR: 18 (01 Jul 2021 05:17) (16 - 18)  SpO2: 94% (01 Jul 2021 05:17) (94% - 97%)   (if applicable)    Left side Chest Tube to suction     General: Alert and oriented. Able to answer question with no SOB. No acute distress.      HEENT: Head is normocephalic and atraumatic. No nasal tenderness. Extraocular muscles are intact. Mucous membranes are moist.     NECK: Supple, no palpable adenopathy.    LUNGS: Decreased breath sounds to left lung side. Has left chest tube to suction. No use of accessory muscle. Non labored.      HEART: S1 S2 Regular rate and no click/ rub.     ABDOMEN: Soft, nontender, and nondistended.  No hepatosplenomegaly is noted. Active bowel sounds.     EXTREMITIES: Without any cyanosis, clubbing, rash, lesions or edema.    NEUROLOGIC: Awake, alert, oriented.     SKIN: Warm and moist. Left flank dressing dry and intact. Non diaphoretic.       LABS:                        15.1   8.58  )-----------( 585      ( 01 Jul 2021 07:21 )             44.2     07-01    135  |  103  |  11  ----------------------------<  97  4.2   |  22  |  0.84    Ca    8.7      01 Jul 2021 07:21    MICROBIOLOGY: (if applicable)    RADIOLOGY & ADDITIONAL STUDIES:  EKG:   CXR:  ECHO:    IMPRESSION: 66y Male PAST MEDICAL & SURGICAL HISTORY:  HLD (hyperlipidemia)    HTN (hypertension)    Smoking     Impression: 67 Y/O   male , active   smoker.  Presented with dyspnea and cough due to massive left pleural effusion completely  filling  left hemithorax with out contralateral mediastinal shift . There is suspicion of underlying mass.  Patient  is not in respiratory  distress .  06-27-21, 06-25-21 Negative for Covid 19 PCR. 06-28-21 had s/p left sided VATS , Pleural Biopsy and Pleurx catheter placement . 06-30-21 Repeat CXR with Left Pneumothorax largely filled in with fluid. Cytology report on 06-30-21 Negative for malignant cells. Pending surgical pathology report.       Suggestion:    O2 saturation ranges 94% to 96%  room air . So far saturating good room air. Can have Oxygen supplementation 2L NC if needed.   -Air borne isolation   -Sputum for AFB x  2 more ( 06-29-21 Negative for AFB )  Reinforced the importance of smoking cessation. On Nicotine patch on x 12 hours .   Repeat CXR today.   Nursing to monitor Left chest tube output and patency .   Continue PRN Albuterol 2 puffs Q 6 Hours.  Time of Visit:  Patient seen and examined.     MEDICATIONS  (STANDING):  azithromycin  IVPB 500 milliGRAM(s) IV Intermittent every 24 hours  azithromycin  IVPB      cefTRIAXone   IVPB 1000 milliGRAM(s) IV Intermittent every 24 hours  nicotine -  14 mG/24Hr(s) Patch 1 patch Transdermal daily      MEDICATIONS  (PRN):  acetaminophen    Suspension .. 650 milliGRAM(s) Oral every 6 hours PRN Mild Pain (1 - 3)  ALBUTerol    90 MICROgram(s) HFA Inhaler 2 Puff(s) Inhalation every 6 hours PRN Shortness of Breath and/or Wheezing  guaiFENesin Oral Liquid (Sugar-Free) 100 milliGRAM(s) Oral every 6 hours PRN Cough  oxycodone    5 mG/acetaminophen 325 mG 1 Tablet(s) Oral every 8 hours PRN Moderate Pain (4 - 6)       Medications up to date at time of exam.    ROS; No fever, chills, cough, congestion. Denies SOB on exam.   PHYSICAL EXAMINATION:  Vital Signs Last 24 Hrs  T(C): 36.1 (01 Jul 2021 05:17), Max: 36.6 (30 Jun 2021 14:13)  T(F): 96.9 (01 Jul 2021 05:17), Max: 97.8 (30 Jun 2021 14:13)  HR: 81 (01 Jul 2021 05:17) (81 - 90)  BP: 109/72 (01 Jul 2021 05:17) (109/72 - 141/84)  BP(mean): --  RR: 18 (01 Jul 2021 05:17) (16 - 18)  SpO2: 94% (01 Jul 2021 05:17) (94% - 97%)   (if applicable)    Left side Chest Tube to suction     General: Alert and oriented. Able to answer question with no SOB. No acute distress.      HEENT: Head is normocephalic and atraumatic. No nasal tenderness. Extraocular muscles are intact. Mucous membranes are moist.     NECK: Supple, no palpable adenopathy.    LUNGS: Decreased breath sounds to left lung side. Has left chest tube to suction. No use of accessory muscle. Non labored.      HEART: S1 S2 Regular rate and no click/ rub.     ABDOMEN: Soft, nontender, and nondistended.  No hepatosplenomegaly is noted. Active bowel sounds.     EXTREMITIES: Without any cyanosis, clubbing, rash, lesions or edema.    NEUROLOGIC: Awake, alert, oriented.     SKIN: Warm and moist. Left flank dressing dry and intact. Non diaphoretic.       LABS:                        15.1   8.58  )-----------( 585      ( 01 Jul 2021 07:21 )             44.2     07-01    135  |  103  |  11  ----------------------------<  97  4.2   |  22  |  0.84    Ca    8.7      01 Jul 2021 07:21    MICROBIOLOGY: (if applicable)    RADIOLOGY & ADDITIONAL STUDIES:  EKG:   CXR:  ECHO:    IMPRESSION: 66y Male PAST MEDICAL & SURGICAL HISTORY:  HLD (hyperlipidemia)    HTN (hypertension)    Smoking     Impression: 67 Y/O   male , active   smoker.  Presented with dyspnea and cough due to massive left pleural effusion completely  filling  left hemithorax with out contralateral mediastinal shift . There is suspicion of underlying mass.  Patient  is not in respiratory  distress .  06-27-21, 06-25-21 Negative for Covid 19 PCR. 06-28-21 had s/p left sided VATS , Pleural Biopsy and Pleurx catheter placement . 06-30-21 Repeat CXR with Left Pneumothorax largely filled in with fluid. Cytology report on 06-30-21 Negative for malignant cells. Pending surgical pathology report.       Suggestion:    O2 saturation ranges 94% to 96%  room air . So far saturating good room air. Can have Oxygen supplementation 2L NC if needed.   -Air borne isolation   -Sputum for AFB x  2 more ( 06-29-21 Negative for AFB )  Reinforced the importance of smoking cessation. On Nicotine patch on x 12 hours .   Repeat CXR today.   Nursing to monitor Left chest tube output and patency .   Continue PRN Albuterol 2 puffs Q 6 Hours.   F/u pleural fluid cytology   Pleural fluid studies not available    Agree with above assessment and plan as transcribed.

## 2021-07-01 NOTE — PROGRESS NOTE ADULT - SUBJECTIVE AND OBJECTIVE BOX
NP Note discussed with  primary attending    Patient is a 66y old  Male who presents with a chief complaint of Cough and Dyspnea (01 Jul 2021 08:39)      INTERVAL HPI/OVERNIGHT EVENTS: no new complaints    MEDICATIONS  (STANDING):  azithromycin  IVPB 500 milliGRAM(s) IV Intermittent every 24 hours  azithromycin  IVPB      cefTRIAXone   IVPB 1000 milliGRAM(s) IV Intermittent every 24 hours  nicotine -  14 mG/24Hr(s) Patch 1 patch Transdermal daily    MEDICATIONS  (PRN):  acetaminophen    Suspension .. 650 milliGRAM(s) Oral every 6 hours PRN Mild Pain (1 - 3)  ALBUTerol    90 MICROgram(s) HFA Inhaler 2 Puff(s) Inhalation every 6 hours PRN Shortness of Breath and/or Wheezing  guaiFENesin Oral Liquid (Sugar-Free) 100 milliGRAM(s) Oral every 6 hours PRN Cough  oxycodone    5 mG/acetaminophen 325 mG 1 Tablet(s) Oral every 8 hours PRN Moderate Pain (4 - 6)      __________________________________________________  REVIEW OF SYSTEMS:    CONSTITUTIONAL: No fever,   RESPIRATORY: No cough; No shortness of breath  CARDIOVASCULAR: No chest pain, no palpitations  GASTROINTESTINAL: No pain. No nausea or vomiting; No diarrhea   NEUROLOGICAL: No headache or numbness, no tremors  MUSCULOSKELETAL: No joint pain, no muscle pain  + pain at chest tube insertion site   GENITOURINARY: no dysuria, no frequency, no hesitancy  PSYCHIATRY: no depression , no anxiety  ALL OTHER  ROS negative        Vital Signs Last 24 Hrs  T(C): 36.1 (01 Jul 2021 05:17), Max: 36.6 (30 Jun 2021 14:13)  T(F): 96.9 (01 Jul 2021 05:17), Max: 97.8 (30 Jun 2021 14:13)  HR: 81 (01 Jul 2021 05:17) (81 - 90)  BP: 109/72 (01 Jul 2021 05:17) (109/72 - 141/84)  BP(mean): --  RR: 18 (01 Jul 2021 05:17) (16 - 18)  SpO2: 94% (01 Jul 2021 05:17) (94% - 97%)    ________________________________________________  PHYSICAL EXAM:  GENERAL: NAD  CHEST/LUNG: Clear to ausculitation bilaterally with good air entry   left chest tube to suction  HEART: S1 S2  regular; no murmurs,   ABDOMEN: Soft, Nontender, Nondistended; Bowel sounds present  EXTREMITIES: no cyanosis; no edema; no calf tenderness  NERVOUS SYSTEM:  Awake and alert; Oriented  to place, person and time ; no new deficits    _________________________________________________  LABS:                        15.1   8.58  )-----------( 585      ( 01 Jul 2021 07:21 )             44.2     07-01    135  |  103  |  11  ----------------------------<  97  4.2   |  22  |  0.84    Ca    8.7      01 Jul 2021 07:21          CAPILLARY BLOOD GLUCOSE            RADIOLOGY & ADDITIONAL TESTS:    Imaging Personally Reviewed:  YES/NO    Consultant(s) Notes Reviewed:   YES/ No    Care Discussed with Consultants :     Plan of care was discussed with patient and /or primary care giver; all questions and concerns were addressed and care was aligned with patient's wishes.

## 2021-07-01 NOTE — PROGRESS NOTE ADULT - SUBJECTIVE AND OBJECTIVE BOX
no fever or sob  but cough more    MEDICATIONS  (STANDING):  azithromycin  IVPB 500 milliGRAM(s) IV Intermittent every 24 hours  azithromycin  IVPB      cefTRIAXone   IVPB 1000 milliGRAM(s) IV Intermittent every 24 hours  nicotine -  14 mG/24Hr(s) Patch 1 patch Transdermal daily    MEDICATIONS  (PRN):  acetaminophen    Suspension .. 650 milliGRAM(s) Oral every 6 hours PRN Mild Pain (1 - 3)  ALBUTerol    90 MICROgram(s) HFA Inhaler 2 Puff(s) Inhalation every 6 hours PRN Shortness of Breath and/or Wheezing  guaiFENesin Oral Liquid (Sugar-Free) 100 milliGRAM(s) Oral every 6 hours PRN Cough  oxycodone    5 mG/acetaminophen 325 mG 1 Tablet(s) Oral every 8 hours PRN Moderate Pain (4 - 6)      Allergies    No Known Allergies    Intolerances        Vital Signs Last 24 Hrs  T(C): 36.1 (01 Jul 2021 05:17), Max: 36.6 (30 Jun 2021 14:13)  T(F): 96.9 (01 Jul 2021 05:17), Max: 97.8 (30 Jun 2021 14:13)  HR: 81 (01 Jul 2021 05:17) (81 - 90)  BP: 109/72 (01 Jul 2021 05:17) (109/72 - 141/84)  BP(mean): --  RR: 18 (01 Jul 2021 05:17) (16 - 18)  SpO2: 94% (01 Jul 2021 05:17) (94% - 97%)    PHYSICAL EXAM  General: adult in NAD  HEENT: clear oropharynx, anicteric sclera, pink conjunctiva  Neck: supple  CV: normal S1/S2 with no murmur rubs or gallops  Lungs: positive air movement b/l ant lungs,clear to auscultation, no wheezes, no rales  Abdomen: soft non-tender non-distended, no hepatosplenomegaly  Ext: no clubbing cyanosis or edema  Skin: no rashes and no petechiae  Neuro: alert and oriented X 4, no focal deficits  LABS:                          15.1   8.58  )-----------( 585      ( 01 Jul 2021 07:21 )             44.2         Mean Cell Volume : 88.4 fl  Mean Cell Hemoglobin : 30.2 pg  Mean Cell Hemoglobin Concentration : 34.2 gm/dL  Auto Neutrophil # : x  Auto Lymphocyte # : x  Auto Monocyte # : x  Auto Eosinophil # : x  Auto Basophil # : x  Auto Neutrophil % : x  Auto Lymphocyte % : x  Auto Monocyte % : x  Auto Eosinophil % : x  Auto Basophil % : x    Serial CBC  Hematocrit 44.2  Hemoglobin 15.1  Plat 585  RBC 5.00  WBC 8.58  Serial CBC  Hematocrit 42.4  Hemoglobin 14.4  Plat 556  RBC 4.82  WBC 8.77  Serial CBC  Hematocrit 40.1  Hemoglobin 13.9  Plat 536  RBC 4.51  WBC 12.03  Serial CBC  Hematocrit 41.5  Hemoglobin 14.3  Plat 498  RBC 4.69  WBC 8.67  Serial CBC  Hematocrit 42.0  Hemoglobin 14.1  Plat 509  RBC 4.71  WBC 8.84    07-01    135  |  103  |  11  ----------------------------<  97  4.2   |  22  |  0.84    Ca    8.7      01 Jul 2021 07:21                      BLOOD SMEAR INTERPRETATION:       RADIOLOGY & ADDITIONAL STUDIES:

## 2021-07-01 NOTE — PROGRESS NOTE ADULT - ASSESSMENT
· Assessment	  66 year old male, smoker, presented with cough, sob, and chest tightness for 2 weeks.  It is getting worse.  CT chest showed very large left effusion    1. large left effusion in a smoker  highly suspicious for malig  will do chest tube  because of right shift of mediastinum  had VATS  will check cytology of effusion  cytology neg  await path from pleural biopsy    2. bone mets  will check CEA, PSA  CEA is high  more likely to be lung ca

## 2021-07-02 LAB
ANION GAP SERPL CALC-SCNC: 10 MMOL/L — SIGNIFICANT CHANGE UP (ref 5–17)
BUN SERPL-MCNC: 14 MG/DL — SIGNIFICANT CHANGE UP (ref 7–18)
CALCIUM SERPL-MCNC: 8.9 MG/DL — SIGNIFICANT CHANGE UP (ref 8.4–10.5)
CHLORIDE SERPL-SCNC: 102 MMOL/L — SIGNIFICANT CHANGE UP (ref 96–108)
CO2 SERPL-SCNC: 23 MMOL/L — SIGNIFICANT CHANGE UP (ref 22–31)
CREAT SERPL-MCNC: 0.89 MG/DL — SIGNIFICANT CHANGE UP (ref 0.5–1.3)
GLUCOSE SERPL-MCNC: 95 MG/DL — SIGNIFICANT CHANGE UP (ref 70–99)
HCT VFR BLD CALC: 45.1 % — SIGNIFICANT CHANGE UP (ref 39–50)
HGB BLD-MCNC: 15.3 G/DL — SIGNIFICANT CHANGE UP (ref 13–17)
MCHC RBC-ENTMCNC: 29.9 PG — SIGNIFICANT CHANGE UP (ref 27–34)
MCHC RBC-ENTMCNC: 33.9 GM/DL — SIGNIFICANT CHANGE UP (ref 32–36)
MCV RBC AUTO: 88.1 FL — SIGNIFICANT CHANGE UP (ref 80–100)
NIGHT BLUE STAIN TISS: SIGNIFICANT CHANGE UP
NRBC # BLD: 0 /100 WBCS — SIGNIFICANT CHANGE UP (ref 0–0)
PLATELET # BLD AUTO: 640 K/UL — HIGH (ref 150–400)
POTASSIUM SERPL-MCNC: 4.7 MMOL/L — SIGNIFICANT CHANGE UP (ref 3.5–5.3)
POTASSIUM SERPL-SCNC: 4.7 MMOL/L — SIGNIFICANT CHANGE UP (ref 3.5–5.3)
RBC # BLD: 5.12 M/UL — SIGNIFICANT CHANGE UP (ref 4.2–5.8)
RBC # FLD: 13.1 % — SIGNIFICANT CHANGE UP (ref 10.3–14.5)
SODIUM SERPL-SCNC: 135 MMOL/L — SIGNIFICANT CHANGE UP (ref 135–145)
SPECIMEN SOURCE: SIGNIFICANT CHANGE UP
WBC # BLD: 9.32 K/UL — SIGNIFICANT CHANGE UP (ref 3.8–10.5)
WBC # FLD AUTO: 9.32 K/UL — SIGNIFICANT CHANGE UP (ref 3.8–10.5)

## 2021-07-02 PROCEDURE — 71045 X-RAY EXAM CHEST 1 VIEW: CPT | Mod: 26,76

## 2021-07-02 RX ORDER — HEPARIN SODIUM 5000 [USP'U]/ML
5000 INJECTION INTRAVENOUS; SUBCUTANEOUS EVERY 8 HOURS
Refills: 0 | Status: DISCONTINUED | OUTPATIENT
Start: 2021-07-02 | End: 2021-07-07

## 2021-07-02 RX ORDER — AZITHROMYCIN 500 MG/1
500 TABLET, FILM COATED ORAL ONCE
Refills: 0 | Status: COMPLETED | OUTPATIENT
Start: 2021-07-02 | End: 2021-07-02

## 2021-07-02 RX ADMIN — Medication 1 PATCH: at 20:55

## 2021-07-02 RX ADMIN — Medication 1 PATCH: at 08:00

## 2021-07-02 RX ADMIN — Medication 1 PATCH: at 11:47

## 2021-07-02 RX ADMIN — HEPARIN SODIUM 5000 UNIT(S): 5000 INJECTION INTRAVENOUS; SUBCUTANEOUS at 14:45

## 2021-07-02 RX ADMIN — CEFTRIAXONE 100 MILLIGRAM(S): 500 INJECTION, POWDER, FOR SOLUTION INTRAMUSCULAR; INTRAVENOUS at 17:59

## 2021-07-02 RX ADMIN — AZITHROMYCIN 255 MILLIGRAM(S): 500 TABLET, FILM COATED ORAL at 19:32

## 2021-07-02 RX ADMIN — HEPARIN SODIUM 5000 UNIT(S): 5000 INJECTION INTRAVENOUS; SUBCUTANEOUS at 22:21

## 2021-07-02 NOTE — PROGRESS NOTE ADULT - SUBJECTIVE AND OBJECTIVE BOX
NP Note discussed with  Primary Attending    Patient is a 66y old  Male who presents with a chief complaint of Cough and Dyspnea (02 Jul 2021 09:37)      INTERVAL HPI/OVERNIGHT EVENTS: no new complaints    MEDICATIONS  (STANDING):  azithromycin  IVPB 500 milliGRAM(s) IV Intermittent every 24 hours  azithromycin  IVPB      cefTRIAXone   IVPB 1000 milliGRAM(s) IV Intermittent every 24 hours  nicotine -  14 mG/24Hr(s) Patch 1 patch Transdermal daily    MEDICATIONS  (PRN):  acetaminophen    Suspension .. 650 milliGRAM(s) Oral every 6 hours PRN Mild Pain (1 - 3)  ALBUTerol    90 MICROgram(s) HFA Inhaler 2 Puff(s) Inhalation every 6 hours PRN Shortness of Breath and/or Wheezing  guaiFENesin Oral Liquid (Sugar-Free) 100 milliGRAM(s) Oral every 6 hours PRN Cough  oxycodone    5 mG/acetaminophen 325 mG 1 Tablet(s) Oral every 8 hours PRN Moderate Pain (4 - 6)      __________________________________________________  REVIEW OF SYSTEMS:    CONSTITUTIONAL: No fever,   EYES: no acute visual disturbances  NECK: No pain or stiffness  RESPIRATORY:  cough occasionally and when cough pain to Lt chest tube site   CARDIOVASCULAR: No chest pain, no palpitations  GASTROINTESTINAL: No pain. No nausea or vomiting; No diarrhea   NEUROLOGICAL: No headache or numbness, no tremors  MUSCULOSKELETAL: No joint pain, no muscle pain  GENITOURINARY: no dysuria, no frequency, no hesitancy  PSYCHIATRY: no depression , no anxiety  ALL OTHER  ROS negative        Vital Signs Last 24 Hrs  T(C): 36.3 (02 Jul 2021 05:07), Max: 36.6 (01 Jul 2021 13:57)  T(F): 97.4 (02 Jul 2021 05:07), Max: 97.9 (01 Jul 2021 13:57)  HR: 89 (02 Jul 2021 05:07) (86 - 89)  BP: 119/77 (02 Jul 2021 05:07) (112/82 - 145/76)  BP(mean): --  RR: 16 (02 Jul 2021 05:07) (16 - 18)  SpO2: 95% (02 Jul 2021 05:07) (95% - 97%)    ________________________________________________  PHYSICAL EXAM:  GENERAL: NAD  HEENT: Normocephalic;  conjunctivae and sclerae clear; moist mucous membranes;   NECK : supple  CHEST/LUNG: Clear to auscultation bilaterally with good air entry  Lt chest tube connected to wall suction   HEART: S1 S2  regular; no murmurs, gallops or rubs  ABDOMEN: Soft, Nontender, Nondistended; Bowel sounds present  EXTREMITIES: no cyanosis; no edema; no calf tenderness  SKIN: warm and dry; no rash  NERVOUS SYSTEM:  Awake and alert; Oriented  to place, person and time ; no new deficits    _________________________________________________  LABS:                        15.3   9.32  )-----------( 640      ( 02 Jul 2021 07:23 )             45.1     07-02    135  |  102  |  14  ----------------------------<  95  4.7   |  23  |  0.89    Ca    8.9      02 Jul 2021 07:23          CAPILLARY BLOOD GLUCOSE            RADIOLOGY & ADDITIONAL TESTS:  < from: Xray Chest 1 View AP/PA (07.01.21 @ 14:19) >    EXAM:  XR CHEST AP OR PA 1V                          EXAM:  XR CHEST PORTABLE ROUTINE 1V                            PROCEDURE DATE:  07/01/2021          INTERPRETATION:  Portable chest radiograph    CLINICAL INFORMATION: Pleural effusion.    TECHNIQUE:  Portable  AP view of the chest was obtained.    COMPARISON: 6/30/2021 available for review.    FINDINGS:  LEFT Pleurx catheter tip overlies LEFT lung base.    The lungs show residual LEFT lateral wall loculated air-fluid filled effusion.  There is medial basilar airspace consolidation/LEFT lower lobe atelectasis obscuring medial diaphragm contour.  No pneumothorax.      Cardiac chambers grossly normal in size as seen on prior CT scan 6/25/2021.  . Visualized osseous structures are intact.    IMPRESSION:   Suspect residual LEFT lateral wall loculated air-fluid filled effusion.  LEFT medial basilar airspace consolidation/LEFT lower lobe atelectasis.  Pleurx catheter overlies LEFT lung base.        FOLLOW-UP AP PORTABLE CHEST RADIOGRAPH 7/1/2021 AT 2:06 PM    No interval change.            AMY MCCARTY MD; Attending Radiologist  This document has been electronically signed. Jul 1 2021  4:53PM    < end of copied text >  < from: CT Chest w/ IV Cont (06.25.21 @ 15:03) >    EXAM:  CT CHEST IC                            PROCEDURE DATE:  06/25/2021          INTERPRETATION:  CLINICAL INFORMATION: Abnormal chest x-ray. Large left pleural effusion.    COMPARISON: None.    CONTRAST/COMPLICATIONS:  IV Contrast: Omnipaque 350 40 cc administered   60 cc discarded  Oral Contrast: NONE  Complications: None reported at time of study completion    PROCEDURE:  CT of the Chest was performed.  Sagittal and coronal reformats were performed.    FINDINGS: Respiratory motion artifact limits evaluation.    LUNGS AND AIRWAYS: Patent central airways.  Small calcified granuloma in the right lung. Near-complete compressive atelectasis of the left lower lobe. Partial compressive atelectasis of the left upper lobe. Complete compressiveatelectasis of the lingula. Mild emphysematous changes in both lungs, compatible with COPD.  PLEURA: Large left pleural effusion. No evidence for pneumothorax.  MEDIASTINUM AND AKIRA: Mildly enlarged 1.1 cm subcarinal lymph node. Right shift of the mediastinum due to the large left pleural effusion.  VESSELS: Ascending aortic aneurysm measuring 4.1 cm in diameter. 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 No evidence for aortic dissection. Small aortic calcifications.  HEART: Heart size is normal. No pericardial effusion.  CHEST WALL AND LOWER NECK: Within normal limits.  VISUALIZED UPPER ABDOMEN: Small hypodense lesions in the liver, too small to characterize. Colonic diverticulosis.  BONES: Sclerotic lesion in the left posterior fifth rib. Small sclerotic lesion in T2 vertebra. If clinically indicated, bone scan may be pursued for further evaluation.    IMPRESSION:  Large left pleural effusion with associated atelectasis of the left lung as described above. Associated rightshift of the mediastinum.    Mildly enlarged subcarinal lymph node.    Ascending aortic aneurysm measuring 4.1 cm in diameter. 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    Mild COPD.    Sclerotic lesion in the left posterior fifth rib. Small sclerotic lesion in T2 vertebra. If clinically indicated, bone scan may be pursued for further evaluation.            MANDO HERNANDEZ MD; Attending Radiologist  This document has been electronically signed. Jun 25 2021  3:35PM    < end of copied text >    Imaging Personally Reviewed:  YES  Consultant(s) Notes Reviewed:   YES    Care Discussed with Consultants :  pulmonology / thoracic surgery/ heme/oncology     Plan of care was discussed with patient and /or primary care giver; all questions and concerns were addressed and care was aligned with patient's wishes.

## 2021-07-02 NOTE — PROGRESS NOTE ADULT - SUBJECTIVE AND OBJECTIVE BOX
feel ok  no fever or chill  cough less  CT is still in  not much drainage    MEDICATIONS  (STANDING):  azithromycin  IVPB 500 milliGRAM(s) IV Intermittent every 24 hours  azithromycin  IVPB      cefTRIAXone   IVPB 1000 milliGRAM(s) IV Intermittent every 24 hours  nicotine -  14 mG/24Hr(s) Patch 1 patch Transdermal daily    MEDICATIONS  (PRN):  acetaminophen    Suspension .. 650 milliGRAM(s) Oral every 6 hours PRN Mild Pain (1 - 3)  ALBUTerol    90 MICROgram(s) HFA Inhaler 2 Puff(s) Inhalation every 6 hours PRN Shortness of Breath and/or Wheezing  guaiFENesin Oral Liquid (Sugar-Free) 100 milliGRAM(s) Oral every 6 hours PRN Cough  oxycodone    5 mG/acetaminophen 325 mG 1 Tablet(s) Oral every 8 hours PRN Moderate Pain (4 - 6)      Allergies    No Known Allergies    Intolerances        Vital Signs Last 24 Hrs  T(C): 36.3 (02 Jul 2021 05:07), Max: 36.6 (01 Jul 2021 13:57)  T(F): 97.4 (02 Jul 2021 05:07), Max: 97.9 (01 Jul 2021 13:57)  HR: 89 (02 Jul 2021 05:07) (86 - 89)  BP: 119/77 (02 Jul 2021 05:07) (112/82 - 145/76)  BP(mean): --  RR: 16 (02 Jul 2021 05:07) (16 - 18)  SpO2: 95% (02 Jul 2021 05:07) (95% - 97%)    PHYSICAL EXAM  General: adult in NAD  HEENT: clear oropharynx, anicteric sclera, pink conjunctiva  Neck: supple  CV: normal S1/S2 with no murmur rubs or gallops  Lungs: positive air movement b/l ant lungs,clear to auscultation, no wheezes, no rales  Abdomen: soft non-tender non-distended, no hepatosplenomegaly  Ext: no clubbing cyanosis or edema  Skin: no rashes and no petechiae  Neuro: alert and oriented X 4, no focal deficits  LABS:                          15.3   9.32  )-----------( 640      ( 02 Jul 2021 07:23 )             45.1         Mean Cell Volume : 88.1 fl  Mean Cell Hemoglobin : 29.9 pg  Mean Cell Hemoglobin Concentration : 33.9 gm/dL  Auto Neutrophil # : x  Auto Lymphocyte # : x  Auto Monocyte # : x  Auto Eosinophil # : x  Auto Basophil # : x  Auto Neutrophil % : x  Auto Lymphocyte % : x  Auto Monocyte % : x  Auto Eosinophil % : x  Auto Basophil % : x    Serial CBC  Hematocrit 45.1  Hemoglobin 15.3  Plat 640  RBC 5.12  WBC 9.32  Serial CBC  Hematocrit 44.2  Hemoglobin 15.1  Plat 585  RBC 5.00  WBC 8.58  Serial CBC  Hematocrit 42.4  Hemoglobin 14.4  Plat 556  RBC 4.82  WBC 8.77  Serial CBC  Hematocrit 40.1  Hemoglobin 13.9  Plat 536  RBC 4.51  WBC 12.03    07-02    135  |  102  |  14  ----------------------------<  95  4.7   |  23  |  0.89    Ca    8.9      02 Jul 2021 07:23                      BLOOD SMEAR INTERPRETATION:       RADIOLOGY & ADDITIONAL STUDIES:

## 2021-07-02 NOTE — DIETITIAN INITIAL EVALUATION ADULT. - PERTINENT MEDS FT
MEDICATIONS:  acetaminophen    Suspension .. 650 every 6 hours PRN  ALBUTerol    90 MICROgram(s) HFA Inhaler 2 every 6 hours PRN  azithromycin  IVPB 500 every 24 hours  azithromycin  IVPB    cefTRIAXone   IVPB 1000 every 24 hours  guaiFENesin Oral Liquid (Sugar-Free) 100 every 6 hours PRN  heparin   Injectable 5000 every 8 hours  nicotine -  14 mG/24Hr(s) Patch 1 daily  oxycodone    5 mG/acetaminophen 325 mG 1 every 8 hours PRN

## 2021-07-02 NOTE — PROGRESS NOTE ADULT - PROBLEM SELECTOR PLAN 1
Malignancy vs PNA sp VATS with plurex cath 6/28   Repeat chest x-ray  showed  Left pneumothorax  largely filled in with fluid.  Chest tube  to pleur evac to suction    continue Rocephin and Azithro empirically   Follow up AFB x 3 (AFB #1 negative, #2 is pending results)   pulmonary Dr. Mendez on board   Dr. López hem/onc on board  thoracic surgery on board  follow up repeat CXR to monitor status of pneumothorax Malignancy vs PNA sp VATS with pleurx cath 6/28  maintaining PleurX catheter on suction until today  per thoracic due to repeat chest x-ray  showed  Left pneumothorax  largely filled in with fluid on 7/1   Continue Rocephin and Azithro empirically  - completing 7days course today   Follow up AFB x 3 (AFB  negative x 2 )  3td AFB sent 7/2    pulmonary Dr. Mendez on board   Dr. López hem/onc on board  thoracic surgery on board  follow up repeat CXR to monitor status of pneumothorax

## 2021-07-02 NOTE — PROGRESS NOTE ADULT - PROBLEM SELECTOR PLAN 3
OPERATIVE REPORT    DATE OF SERVICE: 4/7/2021    PROCEDURE: Colonoscopy with snare polypectomy    REFERRING PROVIDER: Sari Kumar NP     PREOPERATIVE DIAGNOSIS:  History of colon polyps    ANESTHESIA: MAC    INDICATIONS FOR PROCEDURE:  This is a 69 year old female who is undergoing a colonoscopy for surveillance given history of colon polyps.  She has undergone several (3-4) colonoscopy in the past at outside facility.  Last colonoscopy at [facility] in 02/2016 showed a subtle polyp in cecum which on pathology showed benign hyperplastic polyp.  A small 4 mm adenomatous polyp was removed from sigmoid colon.  Maternal grandmother had history of colon cancer.    DESCRIPTION OF PROCEDURE:  The risks, benefits and alternatives to the procedure were discussed with  the patient.  Informed consent was obtained prior to the procedure.  The  patient was brought to the endoscopy room and positioned in the left  lateral decubitus position.  After adequate sedation, a colonoscope was  introduced transanally and under direct visualization advanced to the  cecum. Careful examination was done as the scope was withdrawn slowly.  The patient tolerated the procedure well.    DESCRIPTION OF FINDINGS:  Bowel preparation was good.  Sigmoid colon was somewhat tortuous with mild diverticulosis.  Terminal ileum was examined for about 7-10 cm and appeared normal. Colonic mucosa appeared normal throughout the colon. No angiectasia was seen.  A subtle 8 mm sessile serrated appearing polyp was noted in periappendiceal location and resected using cold snare polypectomy technique.  Another 5 mm flat adenomatous appearing polyp was noted in cecum and resected with snare.  A 3 mm flat hyperplastic appearing mid ascending colon and another 3 mm hyperplastic appearing mid transverse colon polyps were also resected with snare. Retroflexion in the rectum showed minimal internal hemorrhoids.      ENDOSCOPIC DIAGNOSIS:  1. A 5 mm flat  adenomatous appearing and 8 mm sessile serrated appearing cecal polyps.  2. A 3 mm hyperplastic appearing mid ascending and a 3 mm hyperplastic appearing mid transverse colon polyps.  3. Mild sigmoid diverticulosis.    RECOMMENDATIONS:  1.  Follow up biopsy.  2.  High-fiber diet.   3.  Will recommend a colonoscopy for surveillance in 5 years.    Lukasz Dyson MD  4/7/2021  6:41 PM        IMPROVE VTE Individual Risk Assessment  RISK                                                                Points  [  ] Previous VTE                                                  3  [  ] Thrombophilia                                               2  [  ] Lower limb paralysis                                      2        (unable to hold up >15 seconds)    [  ] Current Cancer                                              2         (within 6 months)  [x  ] Immobilization > 24 hrs                                1  [  ] ICU/CCU stay > 24 hours                              1  [x  ] Age > 60                                                      1  IMPROVE VTE Score _________2, -- for DVT proph    Will start on heparin SubQ Heparin subcutaneous for DVT ppx

## 2021-07-02 NOTE — DIETITIAN INITIAL EVALUATION ADULT. - PROBLEM SELECTOR PLAN 1
Pt coming in with dyspnea, pleuritic chest pain and cough  Active smoker  Decreased breath sounds on left side  Malignancy vs PNA   CT: Large left pleural effusion with associated atelectasis of the left lung as described above. Associated right shift of the mediastinum. Sclerotic lesion in the left posterior fifth rib. Small sclerotic lesion in T2 vertebra.  Will start on Rocephin and Azithro empirically   Pt will likely need a thoracentesis   Follow [facility] for TB   Dr. Mendez consulted by ED  Dr. López consulted

## 2021-07-02 NOTE — PROGRESS NOTE ADULT - ATTENDING COMMENTS
discussed management plan with acp covering
discussed management palin in derail with acp covering

## 2021-07-02 NOTE — DIETITIAN INITIAL EVALUATION ADULT. - PERTINENT LABORATORY DATA
07-02 Na135 mmol/L Glu 95 mg/dL K+ 4.7 mmol/L Cr  0.89 mg/dL BUN 14 mg/dL   06-28 Phos 2.7 mg/dL         06-26 Chol 173 mg/dL LDL --    HDL 29 mg/dL<L> Trig 85 mg/dL

## 2021-07-02 NOTE — PROGRESS NOTE ADULT - SUBJECTIVE AND OBJECTIVE BOX
Time of Visit:  Patient seen and examined.     MEDICATIONS  (STANDING):  azithromycin  IVPB 500 milliGRAM(s) IV Intermittent every 24 hours  azithromycin  IVPB      cefTRIAXone   IVPB 1000 milliGRAM(s) IV Intermittent every 24 hours  heparin   Injectable 5000 Unit(s) SubCutaneous every 8 hours  nicotine -  14 mG/24Hr(s) Patch 1 patch Transdermal daily      MEDICATIONS  (PRN):  acetaminophen    Suspension .. 650 milliGRAM(s) Oral every 6 hours PRN Mild Pain (1 - 3)  ALBUTerol    90 MICROgram(s) HFA Inhaler 2 Puff(s) Inhalation every 6 hours PRN Shortness of Breath and/or Wheezing  guaiFENesin Oral Liquid (Sugar-Free) 100 milliGRAM(s) Oral every 6 hours PRN Cough  oxycodone    5 mG/acetaminophen 325 mG 1 Tablet(s) Oral every 8 hours PRN Moderate Pain (4 - 6)       Medications up to date at time of exam.    ROS; No fever, chills, cough, congestion.   PHYSICAL EXAMINATION:  Vital Signs Last 24 Hrs  T(C): 36.3 (02 Jul 2021 12:00), Max: 36.6 (01 Jul 2021 13:57)  T(F): 97.3 (02 Jul 2021 12:00), Max: 97.9 (01 Jul 2021 13:57)  HR: 84 (02 Jul 2021 12:00) (84 - 89)  BP: 119/85 (02 Jul 2021 12:00) (112/82 - 145/76)  BP(mean): --  RR: 18 (02 Jul 2021 12:00) (16 - 18)  SpO2: 97% (02 Jul 2021 12:00) (95% - 97%)   (if applicable)    Left side Chest Tube to suction 9 CT Output 30 ml ).      General: Alert and oriented. Able to answer question with no SOB. No acute distress.      HEENT: Head is normocephalic and atraumatic. No nasal tenderness. Extraocular muscles are intact. Mucous membranes are moist.     NECK: Supple, no palpable adenopathy.    LUNGS: Decreased breath sounds to left lung side. Has left chest tube to suction. No use of accessory muscle. Non labored.      HEART: S1 S2 Regular rate and no click/ rub.     ABDOMEN: Soft, nontender, and nondistended. Active bowel sounds.     EXTREMITIES: Without any cyanosis, clubbing, rash, lesions or edema.    NEUROLOGIC: Awake, alert, oriented.     SKIN: Warm and moist. Left flank dressing dry and intact. Non diaphoretic.       LABS:                        15.3   9.32  )-----------( 640      ( 02 Jul 2021 07:23 )             45.1     07-02    135  |  102  |  14  ----------------------------<  95  4.7   |  23  |  0.89    Ca    8.9      02 Jul 2021 07:23    MICROBIOLOGY: (if applicable)    RADIOLOGY & ADDITIONAL STUDIES:  EKG:   CXR:  ECHO:    IMPRESSION: 66y Male PAST MEDICAL & SURGICAL HISTORY:  HLD (hyperlipidemia)    HTN (hypertension)    Smoking     Impression: 67 Y/O   male , active   smoker.  Presented with dyspnea and cough due to massive left pleural effusion completely  filling  left hemithorax with out contralateral mediastinal shift . There is suspicion of underlying mass.  Patient  is not in respiratory  distress .  06-27-21, 06-25-21 Negative for Covid 19 PCR. 06-28-21 had s/p left sided VATS , Pleural Biopsy and Pleurx catheter placement . 06-30-21 Repeat CXR with Left Pneumothorax largely filled in with fluid. Cytology report on 06-30-21 Negative for malignant cells. Pending surgical pathology report.       Suggestion:    O2 saturation ranges 97%  room air . So far saturating good room air. Can have Oxygen supplementation 2L NC if needed.   -Air borne isolation   -Sputum for AFB x  1 more ( 06-29-21 and 07-01-21 Negative for AFB )  Reinforced the importance of smoking cessation. On Nicotine patch on x 12 hours .   Nursing to monitor Left chest tube output and patency .   Continue PRN Albuterol 2 puffs Q 6 Hours.   F/u pleural fluid cytology   Pleural fluid studies not available   Time of Visit:  Patient seen and examined.     MEDICATIONS  (STANDING):  azithromycin  IVPB 500 milliGRAM(s) IV Intermittent every 24 hours  azithromycin  IVPB      cefTRIAXone   IVPB 1000 milliGRAM(s) IV Intermittent every 24 hours  heparin   Injectable 5000 Unit(s) SubCutaneous every 8 hours  nicotine -  14 mG/24Hr(s) Patch 1 patch Transdermal daily      MEDICATIONS  (PRN):  acetaminophen    Suspension .. 650 milliGRAM(s) Oral every 6 hours PRN Mild Pain (1 - 3)  ALBUTerol    90 MICROgram(s) HFA Inhaler 2 Puff(s) Inhalation every 6 hours PRN Shortness of Breath and/or Wheezing  guaiFENesin Oral Liquid (Sugar-Free) 100 milliGRAM(s) Oral every 6 hours PRN Cough  oxycodone    5 mG/acetaminophen 325 mG 1 Tablet(s) Oral every 8 hours PRN Moderate Pain (4 - 6)       Medications up to date at time of exam.    ROS; No fever, chills, cough, congestion.   PHYSICAL EXAMINATION:  Vital Signs Last 24 Hrs  T(C): 36.3 (02 Jul 2021 12:00), Max: 36.6 (01 Jul 2021 13:57)  T(F): 97.3 (02 Jul 2021 12:00), Max: 97.9 (01 Jul 2021 13:57)  HR: 84 (02 Jul 2021 12:00) (84 - 89)  BP: 119/85 (02 Jul 2021 12:00) (112/82 - 145/76)  BP(mean): --  RR: 18 (02 Jul 2021 12:00) (16 - 18)  SpO2: 97% (02 Jul 2021 12:00) (95% - 97%)   (if applicable)    Left side Chest Tube to suction 9 CT Output 30 ml ).      General: Alert and oriented. Able to answer question with no SOB. No acute distress.      HEENT: Head is normocephalic and atraumatic. No nasal tenderness. Extraocular muscles are intact. Mucous membranes are moist.     NECK: Supple, no palpable adenopathy.    LUNGS: Decreased breath sounds to left lung side. Has left chest tube to suction. No use of accessory muscle. Non labored.      HEART: S1 S2 Regular rate and no click/ rub.     ABDOMEN: Soft, nontender, and nondistended. Active bowel sounds.     EXTREMITIES: Without any cyanosis, clubbing, rash, lesions or edema.    NEUROLOGIC: Awake, alert, oriented.     SKIN: Warm and moist. Left flank dressing dry and intact. Non diaphoretic.       LABS:                        15.3   9.32  )-----------( 640      ( 02 Jul 2021 07:23 )             45.1     07-02    135  |  102  |  14  ----------------------------<  95  4.7   |  23  |  0.89    Ca    8.9      02 Jul 2021 07:23    MICROBIOLOGY: (if applicable)    RADIOLOGY & ADDITIONAL STUDIES:  EKG:   CXR:  ECHO:    IMPRESSION: 66y Male PAST MEDICAL & SURGICAL HISTORY:  HLD (hyperlipidemia)    HTN (hypertension)    Smoking     Impression: 67 Y/O   male , active   smoker.  Presented with dyspnea and cough due to massive left pleural effusion completely  filling  left hemithorax with out contralateral mediastinal shift . There is suspicion of underlying mass.  Patient  is not in respiratory  distress .  06-27-21, 06-25-21 Negative for Covid 19 PCR. 06-28-21 had s/p left sided VATS , Pleural Biopsy and Pleurx catheter placement . 06-30-21 Repeat CXR with Left Pneumothorax largely filled in with fluid. Cytology report on 06-30-21 Negative for malignant cells. Pending surgical pathology report.       Suggestion:    O2 saturation ranges 97%  room air . So far saturating good room air. Can have Oxygen supplementation 2L NC if needed.   -Air borne isolation   -Sputum for AFB x  1 more ( 06-29-21 and 07-01-21 Negative for AFB )  Reinforced the importance of smoking cessation. On Nicotine patch on x 12 hours .   Nursing to monitor Left chest tube output and patency .   Continue PRN Albuterol 2 puffs Q 6 Hours.   F/u pleural fluid cytology   Pleural fluid studies not available    Agree with above assessment and plan as transcribed.

## 2021-07-02 NOTE — DIETITIAN INITIAL EVALUATION ADULT. - OTHER INFO
Pt seen for LOS. Pt is on AFB isolation. Pt seen through [facility].  Pt is Skye speaking With the help of odalis  Id # [id number] . Pt reports Decrease  appetite in the Hospital d/t Dislikes Hospital Food. Per pt weight loss of ~ 4 lbs x in hospital .  . AT home Pt was  eating good. Family brings  ethenic food from Home. Pt Agreed to try Ensure  enlive ( No chocolate) .  S/P VATS, Chest tube to gravity . No chewing or swallowing difficulty Reported.

## 2021-07-02 NOTE — PROGRESS NOTE ADULT - ASSESSMENT
65 y/o male with a PMH of HTN, HLD, smoker  presents to the ED with c/o SOB, cough and chest tightness for the past 2 weeks. Also reported  night sweats and chills for one week   CT Chest w/ IV Cont Large left pleural effusion with associated atelectasis of the left lung as described above. Associated rightshift of the mediastinum. Mildly enlarged subcarinal lymph node.  Admitted for pleural effusion r/o malignancy, r/o TB   TB -  as AFB  negative x 2 times, 3rd AFB sent this morning.   thoracic surgery is following, underwent  left VATS with pleurx cath 6/28 then capping 7/1 but changed to wall suction again on same day due to  Left pneumothorax  largely filled in with fluid from f/u CXR   cytology from 6/28 is negative for malignant Pending pleural biopsy results    Pt was seen at bedside, NAD, breathing easily, co mild pain to chest tube insertion site 4/10, declining pain medications, no fever/chills   AFB x 1 negative, AFB #2 testing. CXR to be repeated today. Thoracic surgery follow.        67 y/o male with a PMH of HTN, HLD, smoker  presents to the ED with c/o SOB, cough and chest tightness for the past 2 weeks. Also reported  night sweats and chills for one week   CT Chest w/ IV Cont Large left pleural effusion with associated atelectasis of the left lung as described above. Associated rightshift of the mediastinum. Mildly enlarged subcarinal lymph node.  Admitted for pleural effusion r/o malignancy, r/o TB   TB -  as AFB  negative x 2 times, 3rd AFB sent this morning.   thoracic surgery is following, underwent  left VATS with pleurx cath 6/28 then capping 7/1 but changed to wall suction again on same day due to  Left pneumothorax  largely filled in with fluid from f/u CXR   cytology from 6/28 is negative for malignant, still pending pleural biopsy surgical path.     Pt was seen at bedside, NAD, breathing easily, c/o  mild pain to chest tube insertion site  when he cough, continue with tylenol and percocet

## 2021-07-02 NOTE — PROGRESS NOTE ADULT - ASSESSMENT
A/P: 66M POD4 s/p left-sided VATS, pleural biopsy, and Pleurx catheter placement  -f/u afternoon CXR, tube now clamped  -VNS setup for intermittent drainage of Pleurx catheter 3x/week upon discharge  -f/u surgical pathology results  -out of bed to chair, incentive spirometry

## 2021-07-02 NOTE — PROGRESS NOTE ADULT - SUBJECTIVE AND OBJECTIVE BOX
66M admitted to medicine service on 6/25 for cough and dyspnea, was found to have large left-sided pleural effusion, concerning for malignancy, for which thoracic surgery was consulted. Now POD4 s/p left-sided VATS, pleural biopsy, and placement of Pleurx catheter. Catheter was placed on suction yesterday due to unresolved pleural effusion on CXR, concern for clotted tubing, which was replaced. Now draining better, output of 30 ml since last night. Repeat CXR this morning reviewed with attending, catheter clamped at bedside, with plan for repeat CXR later.    Patient otherwise with no acute complaints, continuing to saturate well on room air, ambulating, tolerating diet, pain controlled.    Physical:  Vitals: 97.4 temp, 89 HR, 119/77 BP, 16 RR, 95% O2 sat room air  General: no acute distress, awake, alert, and oriented x3  Cardio: regular rate and rhythm  Resp: nonlabored breathing, left sided Pleurx catheter dressing site clean dry and intact, mild and appropriate tenderness around catheter insertion site, catheter on suction with 30 ml of serous fluid in collection chamber since last night  Skin: warm, well perfused    I/O: Pleurx catheter with output of 30 ml of serous fluid since tubing was replaced yesterday night    Labs: 9.3 WBC, 15.3 Hgb, 640 platelets, electrolytes WNL, 14/0.89 BUN/Cr    Imaging: CXR reviewed    Pathology: Cytology returned 6/30 negative for malignant cells. Surgical pathology pending.

## 2021-07-03 LAB
ANION GAP SERPL CALC-SCNC: 10 MMOL/L — SIGNIFICANT CHANGE UP (ref 5–17)
BUN SERPL-MCNC: 14 MG/DL — SIGNIFICANT CHANGE UP (ref 7–18)
CALCIUM SERPL-MCNC: 9.1 MG/DL — SIGNIFICANT CHANGE UP (ref 8.4–10.5)
CHLORIDE SERPL-SCNC: 105 MMOL/L — SIGNIFICANT CHANGE UP (ref 96–108)
CO2 SERPL-SCNC: 22 MMOL/L — SIGNIFICANT CHANGE UP (ref 22–31)
CREAT SERPL-MCNC: 0.83 MG/DL — SIGNIFICANT CHANGE UP (ref 0.5–1.3)
GLUCOSE SERPL-MCNC: 96 MG/DL — SIGNIFICANT CHANGE UP (ref 70–99)
HCT VFR BLD CALC: 43.1 % — SIGNIFICANT CHANGE UP (ref 39–50)
HGB BLD-MCNC: 14.5 G/DL — SIGNIFICANT CHANGE UP (ref 13–17)
MCHC RBC-ENTMCNC: 29.4 PG — SIGNIFICANT CHANGE UP (ref 27–34)
MCHC RBC-ENTMCNC: 33.6 GM/DL — SIGNIFICANT CHANGE UP (ref 32–36)
MCV RBC AUTO: 87.4 FL — SIGNIFICANT CHANGE UP (ref 80–100)
NRBC # BLD: 0 /100 WBCS — SIGNIFICANT CHANGE UP (ref 0–0)
PLATELET # BLD AUTO: 655 K/UL — HIGH (ref 150–400)
POTASSIUM SERPL-MCNC: 4.2 MMOL/L — SIGNIFICANT CHANGE UP (ref 3.5–5.3)
POTASSIUM SERPL-SCNC: 4.2 MMOL/L — SIGNIFICANT CHANGE UP (ref 3.5–5.3)
RBC # BLD: 4.93 M/UL — SIGNIFICANT CHANGE UP (ref 4.2–5.8)
RBC # FLD: 13.1 % — SIGNIFICANT CHANGE UP (ref 10.3–14.5)
SODIUM SERPL-SCNC: 137 MMOL/L — SIGNIFICANT CHANGE UP (ref 135–145)
WBC # BLD: 8.3 K/UL — SIGNIFICANT CHANGE UP (ref 3.8–10.5)
WBC # FLD AUTO: 8.3 K/UL — SIGNIFICANT CHANGE UP (ref 3.8–10.5)

## 2021-07-03 PROCEDURE — 71045 X-RAY EXAM CHEST 1 VIEW: CPT | Mod: 26

## 2021-07-03 RX ADMIN — Medication 1 PATCH: at 19:38

## 2021-07-03 RX ADMIN — HEPARIN SODIUM 5000 UNIT(S): 5000 INJECTION INTRAVENOUS; SUBCUTANEOUS at 05:21

## 2021-07-03 RX ADMIN — Medication 1 PATCH: at 07:22

## 2021-07-03 RX ADMIN — Medication 100 MILLIGRAM(S): at 23:56

## 2021-07-03 RX ADMIN — Medication 1 PATCH: at 11:45

## 2021-07-03 RX ADMIN — HEPARIN SODIUM 5000 UNIT(S): 5000 INJECTION INTRAVENOUS; SUBCUTANEOUS at 21:36

## 2021-07-03 RX ADMIN — Medication 1 PATCH: at 11:46

## 2021-07-03 RX ADMIN — HEPARIN SODIUM 5000 UNIT(S): 5000 INJECTION INTRAVENOUS; SUBCUTANEOUS at 14:38

## 2021-07-03 NOTE — PROGRESS NOTE ADULT - ASSESSMENT
65 y/o male with a PMH of HTN, HLD, smoker  presents to the ED with c/o SOB, cough and chest tightness for the past 2 weeks. Also reported  night sweats and chills for one week   CT Chest w/ IV Cont Large left pleural effusion with associated atelectasis of the left lung as described above. Associated rightshift of the mediastinum. Mildly enlarged subcarinal lymph node.  Admitted for pleural effusion r/o malignancy, r/o TB   TB -  as AFB  negative x 2 times, 3rd AFB sent this morning.   thoracic surgery is following, underwent  left VATS with pleurx cath 6/28 then capping 7/1 but changed to wall suction again on same day due to  Left pneumothorax  largely filled in with fluid from f/u CXR   cytology from 6/28 is negative for malignant, still pending pleural biopsy surgical path.     Pt was seen at bedside, NAD, breathing easily, c/o  mild pain to chest tube insertion site  when he cough, continue with tylenol and percocet

## 2021-07-03 NOTE — PROGRESS NOTE ADULT - SUBJECTIVE AND OBJECTIVE BOX
Patient is a 66y old  Male who presents with a chief complaint of Cough and Dyspnea (02 Jul 2021 09:37)      INTERVAL HPI/OVERNIGHT EVENTS: no new complaints    MEDICATIONS  (STANDING):  heparin   Injectable 5000 Unit(s) SubCutaneous every 8 hours  nicotine -  14 mG/24Hr(s) Patch 1 patch Transdermal daily    MEDICATIONS  (PRN):  acetaminophen    Suspension .. 650 milliGRAM(s) Oral every 6 hours PRN Mild Pain (1 - 3)  ALBUTerol    90 MICROgram(s) HFA Inhaler 2 Puff(s) Inhalation every 6 hours PRN Shortness of Breath and/or Wheezing  guaiFENesin Oral Liquid (Sugar-Free) 100 milliGRAM(s) Oral every 6 hours PRN Cough  oxycodone    5 mG/acetaminophen 325 mG 1 Tablet(s) Oral every 8 hours PRN Moderate Pain (4 - 6)        __________________________________________________  REVIEW OF SYSTEMS:    CONSTITUTIONAL: No fever,   EYES: no acute visual disturbances  NECK: No pain or stiffness  RESPIRATORY:  cough occasionally and when cough pain to Lt chest tube site   CARDIOVASCULAR: No chest pain, no palpitations  GASTROINTESTINAL: No pain. No nausea or vomiting; No diarrhea   NEUROLOGICAL: No headache or numbness, no tremors  MUSCULOSKELETAL: No joint pain, no muscle pain  GENITOURINARY: no dysuria, no frequency, no hesitancy  PSYCHIATRY: no depression , no anxiety  ALL OTHER  ROS negative        Vital Signs Last 24 Hrs  T(C): 37.7 (03 Jul 2021 20:11), Max: 37.7 (03 Jul 2021 20:11)  T(F): 99.8 (03 Jul 2021 20:11), Max: 99.8 (03 Jul 2021 20:11)  HR: 88 (03 Jul 2021 20:11) (86 - 95)  BP: 131/83 (03 Jul 2021 20:11) (109/74 - 131/83)  BP(mean): --  RR: 16 (03 Jul 2021 20:11) (16 - 18)  SpO2: 96% (03 Jul 2021 20:11) (95% - 99%)    ________________________________________________  PHYSICAL EXAM:  GENERAL: NAD  HEENT: Normocephalic;  conjunctivae and sclerae clear; moist mucous membranes;   NECK : supple  CHEST/LUNG: Clear to auscultation bilaterally with good air entry  Lt chest tube connected to wall suction   HEART: S1 S2  regular; no murmurs, gallops or rubs  ABDOMEN: Soft, Nontender, Nondistended; Bowel sounds present  EXTREMITIES: no cyanosis; no edema; no calf tenderness  SKIN: warm and dry; no rash  NERVOUS SYSTEM:  Awake and alert; Oriented  to place, person and time ; no new deficits    _________________________________________________  LABS:  07-03    137  |  105  |  14  ----------------------------<  96  4.2   |  22  |  0.83    Ca    9.1      03 Jul 2021 06:47      Creatinine Trend: 0.83 <--, 0.89 <--, 0.84 <--, 0.84 <--, 0.87 <--, 0.87 <--, 0.94 <--                        14.5   8.30  )-----------( 655      ( 03 Jul 2021 06:47 )             43.1     Urine Studies:                    INTERPRETATION:  Portable chest radiograph    CLINICAL INFORMATION: Pleural effusion.    TECHNIQUE:  Portable  AP view of the chest was obtained.    COMPARISON: 6/30/2021 available for review.    FINDINGS:  LEFT Pleurx catheter tip overlies LEFT lung base.    The lungs show residual LEFT lateral wall loculated air-fluid filled effusion.  There is medial basilar airspace consolidation/LEFT lower lobe atelectasis obscuring medial diaphragm contour.  No pneumothorax.      Cardiac chambers grossly normal in size as seen on prior CT scan 6/25/2021.  . Visualized osseous structures are intact.    IMPRESSION:   Suspect residual LEFT lateral wall loculated air-fluid filled effusion.  LEFT medial basilar airspace consolidation/LEFT lower lobe atelectasis.  Pleurx catheter overlies LEFT lung base.        FOLLOW-UP AP PORTABLE CHEST RADIOGRAPH 7/1/2021 AT 2:06 PM    No interval change.            AMY MCCARTY MD; Attending Radiologist  This document has been electronically signed. Jul 1 2021  4:53PM    < end of copied text >  < from: CT Chest w/ IV Cont (06.25.21 @ 15:03) >    EXAM:  CT CHEST IC                            PROCEDURE DATE:  06/25/2021          INTERPRETATION:  CLINICAL INFORMATION: Abnormal chest x-ray. Large left pleural effusion.    COMPARISON: None.    CONTRAST/COMPLICATIONS:  IV Contrast: Omnipaque 350 40 cc administered   60 cc discarded  Oral Contrast: NONE  Complications: None reported at time of study completion    PROCEDURE:  CT of the Chest was performed.  Sagittal and coronal reformats were performed.    FINDINGS: Respiratory motion artifact limits evaluation.    LUNGS AND AIRWAYS: Patent central airways.  Small calcified granuloma in the right lung. Near-complete compressive atelectasis of the left lower lobe. Partial compressive atelectasis of the left upper lobe. Complete compressiveatelectasis of the lingula. Mild emphysematous changes in both lungs, compatible with COPD.  PLEURA: Large left pleural effusion. No evidence for pneumothorax.  MEDIASTINUM AND AKIRA: Mildly enlarged 1.1 cm subcarinal lymph node. Right shift of the mediastinum due to the large left pleural effusion.  VESSELS: Ascending aortic aneurysm measuring 4.1 cm in diameter. 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 No evidence for aortic dissection. Small aortic calcifications.  HEART: Heart size is normal. No pericardial effusion.  CHEST WALL AND LOWER NECK: Within normal limits.  VISUALIZED UPPER ABDOMEN: Small hypodense lesions in the liver, too small to characterize. Colonic diverticulosis.  BONES: Sclerotic lesion in the left posterior fifth rib. Small sclerotic lesion in T2 vertebra. If clinically indicated, bone scan may be pursued for further evaluation.    IMPRESSION:  Large left pleural effusion with associated atelectasis of the left lung as described above. Associated rightshift of the mediastinum.    Mildly enlarged subcarinal lymph node.    Ascending aortic aneurysm measuring 4.1 cm in diameter. 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    Mild COPD.    Sclerotic lesion in the left posterior fifth rib. Small sclerotic lesion in T2 vertebra. If clinically indicated, bone scan may be pursued for further evaluation.            MANDO HERNANDEZ MD; Attending Radiologist  This document has been electronically signed. Jun 25 2021  3:35PM    < end of copied text >    Imaging Personally Reviewed:  YES  Consultant(s) Notes Reviewed:   YES    Care Discussed with Consultants :  pulmonology / thoracic surgery/ heme/oncology     Plan of care was discussed with patient and /or primary care giver; all questions and concerns were addressed and care was aligned with patient's wishes.

## 2021-07-03 NOTE — PROGRESS NOTE ADULT - PROBLEM SELECTOR PLAN 1
Malignancy vs PNA sp VATS with pleurx cath 6/28  maintaining PleurX catheter on suction until today  per thoracic due to repeat chest x-ray  showed  Left pneumothorax  largely filled in with fluid on 7/1   Continue Rocephin and Azithro empirically  - completing 7days course today   Follow up AFB x 3 (AFB  negative x 2 )  3td AFB sent 7/2    pulmonary Dr. Mendez on board   Dr. López hem/onc on board  thoracic surgery on board  follow up repeat CXR to monitor status of pneumothorax

## 2021-07-03 NOTE — PROGRESS NOTE ADULT - SUBJECTIVE AND OBJECTIVE BOX
66M POD5 s/p left sided VATS, pleural biopsy, and pleurX catheter placement, seen and examined at bedside. No acute events overnight. Patient currently with no acute issues. PleurX catheter was capped yesterday. Repeat CXR obtained today, does not appear to have interval change compared to yesterday.    ICU Vital Signs Last 24 Hrs  T(C): 36.3 (03 Jul 2021 05:19), Max: 36.4 (02 Jul 2021 21:11)  T(F): 97.4 (03 Jul 2021 05:19), Max: 97.5 (02 Jul 2021 21:11)  HR: 86 (03 Jul 2021 05:19) (84 - 100)  BP: 109/74 (03 Jul 2021 05:19) (109/74 - 134/92)  BP(mean): --  ABP: --  ABP(mean): --  RR: 18 (03 Jul 2021 05:19) (17 - 18)  SpO2: 95% (03 Jul 2021 05:19) (95% - 97%)    Physical:  General: NAD  Cardio: RRR  Resp: nonlabored breathing, saturating well on room air, left sided pleurX catheter in place with cap and dressing c/d/i  Skin: warm, well-perfused    Labs:                       14.5   8.30  )-----------( 655      ( 03 Jul 2021 06:47 )             43.1   07-03    137  |  105  |  14  ----------------------------<  96  4.2   |  22  |  0.83    Ca    9.1      03 Jul 2021 06:47        Imaging: CXR does not appear to have interval change compared to previous CXR    Pathology: Cytology returned 6/30 negative for malignant cells. Surgical pathology pending.

## 2021-07-03 NOTE — PROGRESS NOTE ADULT - ASSESSMENT
A/P: 66M POD5 s/p left-sided VATS, pleural biopsy, and Pleurx catheter placement  -will discuss today's CXR with attending and update recommendations  -VNS setup for intermittent drainage of Pleurx catheter 3x/week upon discharge  -f/u surgical pathology results  -out of bed to chair, incentive spirometry

## 2021-07-04 LAB
ANION GAP SERPL CALC-SCNC: 8 MMOL/L — SIGNIFICANT CHANGE UP (ref 5–17)
BUN SERPL-MCNC: 16 MG/DL — SIGNIFICANT CHANGE UP (ref 7–18)
CALCIUM SERPL-MCNC: 9.3 MG/DL — SIGNIFICANT CHANGE UP (ref 8.4–10.5)
CHLORIDE SERPL-SCNC: 104 MMOL/L — SIGNIFICANT CHANGE UP (ref 96–108)
CO2 SERPL-SCNC: 23 MMOL/L — SIGNIFICANT CHANGE UP (ref 22–31)
CREAT SERPL-MCNC: 0.92 MG/DL — SIGNIFICANT CHANGE UP (ref 0.5–1.3)
GLUCOSE SERPL-MCNC: 98 MG/DL — SIGNIFICANT CHANGE UP (ref 70–99)
HCT VFR BLD CALC: 43.6 % — SIGNIFICANT CHANGE UP (ref 39–50)
HGB BLD-MCNC: 14.8 G/DL — SIGNIFICANT CHANGE UP (ref 13–17)
MCHC RBC-ENTMCNC: 29.8 PG — SIGNIFICANT CHANGE UP (ref 27–34)
MCHC RBC-ENTMCNC: 33.9 GM/DL — SIGNIFICANT CHANGE UP (ref 32–36)
MCV RBC AUTO: 87.9 FL — SIGNIFICANT CHANGE UP (ref 80–100)
NRBC # BLD: 0 /100 WBCS — SIGNIFICANT CHANGE UP (ref 0–0)
PLATELET # BLD AUTO: 641 K/UL — HIGH (ref 150–400)
POTASSIUM SERPL-MCNC: 4.3 MMOL/L — SIGNIFICANT CHANGE UP (ref 3.5–5.3)
POTASSIUM SERPL-SCNC: 4.3 MMOL/L — SIGNIFICANT CHANGE UP (ref 3.5–5.3)
RBC # BLD: 4.96 M/UL — SIGNIFICANT CHANGE UP (ref 4.2–5.8)
RBC # FLD: 12.9 % — SIGNIFICANT CHANGE UP (ref 10.3–14.5)
SODIUM SERPL-SCNC: 135 MMOL/L — SIGNIFICANT CHANGE UP (ref 135–145)
WBC # BLD: 10.3 K/UL — SIGNIFICANT CHANGE UP (ref 3.8–10.5)
WBC # FLD AUTO: 10.3 K/UL — SIGNIFICANT CHANGE UP (ref 3.8–10.5)

## 2021-07-04 RX ADMIN — HEPARIN SODIUM 5000 UNIT(S): 5000 INJECTION INTRAVENOUS; SUBCUTANEOUS at 07:06

## 2021-07-04 RX ADMIN — Medication 1 PATCH: at 07:18

## 2021-07-04 RX ADMIN — HEPARIN SODIUM 5000 UNIT(S): 5000 INJECTION INTRAVENOUS; SUBCUTANEOUS at 13:25

## 2021-07-04 RX ADMIN — Medication 1 PATCH: at 11:10

## 2021-07-04 RX ADMIN — Medication 1 PATCH: at 19:30

## 2021-07-04 RX ADMIN — Medication 1 PATCH: at 11:11

## 2021-07-04 RX ADMIN — HEPARIN SODIUM 5000 UNIT(S): 5000 INJECTION INTRAVENOUS; SUBCUTANEOUS at 21:56

## 2021-07-04 NOTE — PROGRESS NOTE ADULT - SUBJECTIVE AND OBJECTIVE BOX
Patient is a 66y old  Male who presents with a chief complaint of Cough and Dyspnea (02 Jul 2021 09:37)      INTERVAL HPI/OVERNIGHT EVENTS: no new complaints    MEDICATIONS  (STANDING):  heparin   Injectable 5000 Unit(s) SubCutaneous every 8 hours  nicotine -  14 mG/24Hr(s) Patch 1 patch Transdermal daily    MEDICATIONS  (PRN):  acetaminophen    Suspension .. 650 milliGRAM(s) Oral every 6 hours PRN Mild Pain (1 - 3)  ALBUTerol    90 MICROgram(s) HFA Inhaler 2 Puff(s) Inhalation every 6 hours PRN Shortness of Breath and/or Wheezing  guaiFENesin Oral Liquid (Sugar-Free) 100 milliGRAM(s) Oral every 6 hours PRN Cough  oxycodone    5 mG/acetaminophen 325 mG 1 Tablet(s) Oral every 8 hours PRN Moderate Pain (4 - 6)      __________________________________________________  REVIEW OF SYSTEMS:    CONSTITUTIONAL: No fever,   EYES: no acute visual disturbances  NECK: No pain or stiffness  RESPIRATORY:  cough occasionally and when cough pain to Lt chest tube site   CARDIOVASCULAR: No chest pain, no palpitations  GASTROINTESTINAL: No pain. No nausea or vomiting; No diarrhea   NEUROLOGICAL: No headache or numbness, no tremors  MUSCULOSKELETAL: No joint pain, no muscle pain  GENITOURINARY: no dysuria, no frequency, no hesitancy  PSYCHIATRY: no depression , no anxiety  ALL OTHER  ROS negative        Vital Signs Last 24 Hrs  T(C): 35.3 (04 Jul 2021 21:17), Max: 36.7 (04 Jul 2021 12:20)  T(F): 95.6 (04 Jul 2021 21:17), Max: 98.1 (04 Jul 2021 12:20)  HR: 86 (04 Jul 2021 21:17) (83 - 86)  BP: 116/76 (04 Jul 2021 21:17) (109/76 - 128/80)  BP(mean): 92 (04 Jul 2021 12:20) (92 - 92)  RR: 18 (04 Jul 2021 21:17) (18 - 18)  SpO2: 96% (04 Jul 2021 21:17) (94% - 97%)    PHYSICAL EXAM:  GENERAL: NAD  HEENT: Normocephalic;  conjunctivae and sclerae clear; moist mucous membranes;   NECK : supple  CHEST/LUNG: Clear to auscultation bilaterally with good air entry  Lt chest tube connected to wall suction   HEART: S1 S2  regular; no murmurs, gallops or rubs  ABDOMEN: Soft, Nontender, Nondistended; Bowel sounds present  EXTREMITIES: no cyanosis; no edema; no calf tenderness  SKIN: warm and dry; no rash  NERVOUS SYSTEM:  Awake and alert; Oriented  to place, person and time ; no new deficits    LABS:  07-04    135  |  104  |  16  ----------------------------<  98  4.3   |  23  |  0.92    Ca    9.3      04 Jul 2021 06:12      Creatinine Trend: 0.92 <--, 0.83 <--, 0.89 <--, 0.84 <--, 0.84 <--, 0.87 <--, 0.87 <--                        14.8   10.30 )-----------( 641      ( 04 Jul 2021 06:12 )             43.6     Urine Studies:                                    INTERPRETATION:  Portable chest radiograph    CLINICAL INFORMATION: Pleural effusion.    TECHNIQUE:  Portable  AP view of the chest was obtained.    COMPARISON: 6/30/2021 available for review.    FINDINGS:  LEFT Pleurx catheter tip overlies LEFT lung base.    The lungs show residual LEFT lateral wall loculated air-fluid filled effusion.  There is medial basilar airspace consolidation/LEFT lower lobe atelectasis obscuring medial diaphragm contour.  No pneumothorax.      Cardiac chambers grossly normal in size as seen on prior CT scan 6/25/2021.  . Visualized osseous structures are intact.    IMPRESSION:   Suspect residual LEFT lateral wall loculated air-fluid filled effusion.  LEFT medial basilar airspace consolidation/LEFT lower lobe atelectasis.  Pleurx catheter overlies LEFT lung base.        FOLLOW-UP AP PORTABLE CHEST RADIOGRAPH 7/1/2021 AT 2:06 PM    No interval change.            AMY MCCARTY MD; Attending Radiologist  This document has been electronically signed. Jul 1 2021  4:53PM    < end of copied text >  < from: CT Chest w/ IV Cont (06.25.21 @ 15:03) >    EXAM:  CT CHEST IC                            PROCEDURE DATE:  06/25/2021          INTERPRETATION:  CLINICAL INFORMATION: Abnormal chest x-ray. Large left pleural effusion.    COMPARISON: None.    CONTRAST/COMPLICATIONS:  IV Contrast: Omnipaque 350 40 cc administered   60 cc discarded  Oral Contrast: NONE  Complications: None reported at time of study completion    PROCEDURE:  CT of the Chest was performed.  Sagittal and coronal reformats were performed.    FINDINGS: Respiratory motion artifact limits evaluation.    LUNGS AND AIRWAYS: Patent central airways.  Small calcified granuloma in the right lung. Near-complete compressive atelectasis of the left lower lobe. Partial compressive atelectasis of the left upper lobe. Complete compressiveatelectasis of the lingula. Mild emphysematous changes in both lungs, compatible with COPD.  PLEURA: Large left pleural effusion. No evidence for pneumothorax.  MEDIASTINUM AND AKIRA: Mildly enlarged 1.1 cm subcarinal lymph node. Right shift of the mediastinum due to the large left pleural effusion.  VESSELS: Ascending aortic aneurysm measuring 4.1 cm in diameter. 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 No evidence for aortic dissection. Small aortic calcifications.  HEART: Heart size is normal. No pericardial effusion.  CHEST WALL AND LOWER NECK: Within normal limits.  VISUALIZED UPPER ABDOMEN: Small hypodense lesions in the liver, too small to characterize. Colonic diverticulosis.  BONES: Sclerotic lesion in the left posterior fifth rib. Small sclerotic lesion in T2 vertebra. If clinically indicated, bone scan may be pursued for further evaluation.    IMPRESSION:  Large left pleural effusion with associated atelectasis of the left lung as described above. Associated rightshift of the mediastinum.    Mildly enlarged subcarinal lymph node.    Ascending aortic aneurysm measuring 4.1 cm in diameter. 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    Mild COPD.    Sclerotic lesion in the left posterior fifth rib. Small sclerotic lesion in T2 vertebra. If clinically indicated, bone scan may be pursued for further evaluation.            MANDO HERNANEDZ MD; Attending Radiologist  This document has been electronically signed. Jun 25 2021  3:35PM    < end of copied text >    Imaging Personally Reviewed:  YES  Consultant(s) Notes Reviewed:   YES    Care Discussed with Consultants :  pulmonology / thoracic surgery/ heme/oncology     Plan of care was discussed with patient and /or primary care giver; all questions and concerns were addressed and care was aligned with patient's wishes.

## 2021-07-05 LAB
ANION GAP SERPL CALC-SCNC: 7 MMOL/L — SIGNIFICANT CHANGE UP (ref 5–17)
BUN SERPL-MCNC: 16 MG/DL — SIGNIFICANT CHANGE UP (ref 7–18)
CALCIUM SERPL-MCNC: 9.1 MG/DL — SIGNIFICANT CHANGE UP (ref 8.4–10.5)
CHLORIDE SERPL-SCNC: 105 MMOL/L — SIGNIFICANT CHANGE UP (ref 96–108)
CO2 SERPL-SCNC: 23 MMOL/L — SIGNIFICANT CHANGE UP (ref 22–31)
CREAT SERPL-MCNC: 0.85 MG/DL — SIGNIFICANT CHANGE UP (ref 0.5–1.3)
GLUCOSE SERPL-MCNC: 123 MG/DL — HIGH (ref 70–99)
HCT VFR BLD CALC: 42.3 % — SIGNIFICANT CHANGE UP (ref 39–50)
HGB BLD-MCNC: 14.3 G/DL — SIGNIFICANT CHANGE UP (ref 13–17)
MCHC RBC-ENTMCNC: 29.8 PG — SIGNIFICANT CHANGE UP (ref 27–34)
MCHC RBC-ENTMCNC: 33.8 GM/DL — SIGNIFICANT CHANGE UP (ref 32–36)
MCV RBC AUTO: 88.1 FL — SIGNIFICANT CHANGE UP (ref 80–100)
NRBC # BLD: 0 /100 WBCS — SIGNIFICANT CHANGE UP (ref 0–0)
PLATELET # BLD AUTO: 630 K/UL — HIGH (ref 150–400)
POTASSIUM SERPL-MCNC: 4.3 MMOL/L — SIGNIFICANT CHANGE UP (ref 3.5–5.3)
POTASSIUM SERPL-SCNC: 4.3 MMOL/L — SIGNIFICANT CHANGE UP (ref 3.5–5.3)
RBC # BLD: 4.8 M/UL — SIGNIFICANT CHANGE UP (ref 4.2–5.8)
RBC # FLD: 13.2 % — SIGNIFICANT CHANGE UP (ref 10.3–14.5)
SODIUM SERPL-SCNC: 135 MMOL/L — SIGNIFICANT CHANGE UP (ref 135–145)
WBC # BLD: 8.72 K/UL — SIGNIFICANT CHANGE UP (ref 3.8–10.5)
WBC # FLD AUTO: 8.72 K/UL — SIGNIFICANT CHANGE UP (ref 3.8–10.5)

## 2021-07-05 RX ADMIN — Medication 1 PATCH: at 07:31

## 2021-07-05 RX ADMIN — HEPARIN SODIUM 5000 UNIT(S): 5000 INJECTION INTRAVENOUS; SUBCUTANEOUS at 13:11

## 2021-07-05 RX ADMIN — Medication 100 MILLIGRAM(S): at 17:14

## 2021-07-05 RX ADMIN — Medication 100 MILLIGRAM(S): at 12:10

## 2021-07-05 RX ADMIN — HEPARIN SODIUM 5000 UNIT(S): 5000 INJECTION INTRAVENOUS; SUBCUTANEOUS at 21:24

## 2021-07-05 RX ADMIN — Medication 100 MILLIGRAM(S): at 23:22

## 2021-07-05 RX ADMIN — HEPARIN SODIUM 5000 UNIT(S): 5000 INJECTION INTRAVENOUS; SUBCUTANEOUS at 06:56

## 2021-07-05 RX ADMIN — Medication 1 PATCH: at 20:01

## 2021-07-05 RX ADMIN — Medication 1 PATCH: at 12:10

## 2021-07-05 RX ADMIN — Medication 1 PATCH: at 11:50

## 2021-07-05 NOTE — PROGRESS NOTE ADULT - SUBJECTIVE AND OBJECTIVE BOX
Time of Visit:  Patient seen and examined.     MEDICATIONS  (STANDING):  heparin   Injectable 5000 Unit(s) SubCutaneous every 8 hours  nicotine -  14 mG/24Hr(s) Patch 1 patch Transdermal daily      MEDICATIONS  (PRN):  acetaminophen    Suspension .. 650 milliGRAM(s) Oral every 6 hours PRN Mild Pain (1 - 3)  ALBUTerol    90 MICROgram(s) HFA Inhaler 2 Puff(s) Inhalation every 6 hours PRN Shortness of Breath and/or Wheezing  guaiFENesin Oral Liquid (Sugar-Free) 100 milliGRAM(s) Oral every 6 hours PRN Cough  oxycodone    5 mG/acetaminophen 325 mG 1 Tablet(s) Oral every 8 hours PRN Moderate Pain (4 - 6)       Medications up to date at time of exam.    ROS; No fever, chills, cough, congestion on exam.   PHYSICAL EXAMINATION:  Vital Signs Last 24 Hrs  T(C): 36.2 (05 Jul 2021 13:14), Max: 36.6 (05 Jul 2021 06:00)  T(F): 97.2 (05 Jul 2021 13:14), Max: 97.9 (05 Jul 2021 06:00)  HR: 88 (05 Jul 2021 13:14) (85 - 88)  BP: 111/72 (05 Jul 2021 13:14) (99/66 - 116/76)  BP(mean): --  RR: 16 (05 Jul 2021 13:14) (16 - 18)  SpO2: 95% (05 Jul 2021 13:14) (95% - 96%)   (if applicable)    General: Alert and oriented. Able to answer question with no SOB. No acute distress.      HEENT: Head is normocephalic and atraumatic. No nasal tenderness. Extraocular muscles are intact. Mucous membranes are moist.     NECK: Supple, no palpable adenopathy.    LUNGS:  Has left flank Pleurx catheter with dry dressing . No use of accessory muscle. Non labored.      HEART: S1 S2 Regular rate and no click/ rub.     ABDOMEN: Soft, nontender, and nondistended. Active bowel sounds.     EXTREMITIES: Without any cyanosis, clubbing, rash, lesions or edema.    NEUROLOGIC: Awake, alert, oriented.     SKIN: Warm and moist. Left flank dressing dry and intact. Non diaphoretic.       LABS:                        14.3   8.72  )-----------( 630      ( 05 Jul 2021 07:04 )             42.3     07-05    135  |  105  |  16  ----------------------------<  123<H>  4.3   |  23  |  0.85    Ca    9.1      05 Jul 2021 07:04      MICROBIOLOGY: (if applicable)    RADIOLOGY & ADDITIONAL STUDIES:  EKG:   CXR:  ECHO:    IMPRESSION: 66y Male PAST MEDICAL & SURGICAL HISTORY:  HLD (hyperlipidemia)    HTN (hypertension)    Smoking     Impression: 65 Y/O   male , active   smoker.  Presented with dyspnea and cough due to massive left pleural effusion completely  filling  left hemithorax with out contralateral mediastinal shift . There is suspicion of underlying mass.  Patient  is not in respiratory  distress .  06-27-21, 06-25-21 Negative for Covid 19 PCR. 06-28-21 had s/p left sided VATS , Pleural Biopsy and Pleurx catheter placement . 06-30-21 Repeat CXR with Left Pneumothorax largely filled in with fluid. Cytology report on 06-30-21 Negative for malignant cells. Pending surgical pathology report.       Suggestion:    O2 saturation ranges 98%  room air . So far saturating good room air.    06-30-21 ,07-01-21 and 07-02-21 Negative for AFB.   Reinforced the importance of smoking cessation. On Nicotine patch on x 12 hours .   Nursing to monitor Left chest tube output and patency .   Pulmonary oral hygiene care.   Continue PRN Albuterol 2 puffs Q 6 Hours.   F/u pleural fluid cytology .   Pleural fluid studies not available.

## 2021-07-05 NOTE — PROGRESS NOTE ADULT - PROBLEM SELECTOR PLAN 1
Malignancy vs PNA sp VATS with pleurx cath 6/28  maintaining PleurX catheter on suction until today  per thoracic due to repeat chest x-ray  showed  Left pneumothorax  largely filled in with fluid on 7/1   Continue Rocephin and Azithro empirically  - completing 7days course today   Follow up AFB x 3 (AFB  negative x 2 )  3td AFB sent 7/2    pulm f/u

## 2021-07-05 NOTE — PROGRESS NOTE ADULT - SUBJECTIVE AND OBJECTIVE BOX
Patient is a 66y old  Male who presents with a chief complaint of Cough and Dyspnea (02 Jul 2021 09:37)      INTERVAL HPI/OVERNIGHT EVENTS: no new complaints    MEDICATIONS  (STANDING):  heparin   Injectable 5000 Unit(s) SubCutaneous every 8 hours  nicotine -  14 mG/24Hr(s) Patch 1 patch Transdermal daily    MEDICATIONS  (PRN):  acetaminophen    Suspension .. 650 milliGRAM(s) Oral every 6 hours PRN Mild Pain (1 - 3)  ALBUTerol    90 MICROgram(s) HFA Inhaler 2 Puff(s) Inhalation every 6 hours PRN Shortness of Breath and/or Wheezing  guaiFENesin Oral Liquid (Sugar-Free) 100 milliGRAM(s) Oral every 6 hours PRN Cough  oxycodone    5 mG/acetaminophen 325 mG 1 Tablet(s) Oral every 8 hours PRN Moderate Pain (4 - 6)    __________________________________________________  REVIEW OF SYSTEMS:    CONSTITUTIONAL: No fever,   EYES: no acute visual disturbances  NECK: No pain or stiffness  RESPIRATORY:  cough occasionally and when cough pain to Lt chest tube site   CARDIOVASCULAR: No chest pain, no palpitations  GASTROINTESTINAL: No pain. No nausea or vomiting; No diarrhea   NEUROLOGICAL: No headache or numbness, no tremors  MUSCULOSKELETAL: No joint pain, no muscle pain  GENITOURINARY: no dysuria, no frequency, no hesitancy  PSYCHIATRY: no depression , no anxiety  ALL OTHER  ROS negative      Vital Signs Last 24 Hrs  T(C): 36.2 (05 Jul 2021 13:14), Max: 36.6 (05 Jul 2021 06:00)  T(F): 97.2 (05 Jul 2021 13:14), Max: 97.9 (05 Jul 2021 06:00)  HR: 88 (05 Jul 2021 13:14) (85 - 88)  BP: 111/72 (05 Jul 2021 13:14) (99/66 - 116/76)  BP(mean): --  RR: 16 (05 Jul 2021 13:14) (16 - 18)  SpO2: 95% (05 Jul 2021 13:14) (95% - 96%)    PHYSICAL EXAM:  GENERAL: NAD  HEENT: Normocephalic;  conjunctivae and sclerae clear; moist mucous membranes;   NECK : supple  CHEST/LUNG: Clear to auscultation bilaterally with good air entry  Lt chest tube connected to wall suction   HEART: S1 S2  regular; no murmurs, gallops or rubs  ABDOMEN: Soft, Nontender, Nondistended; Bowel sounds present  EXTREMITIES: no cyanosis; no edema; no calf tenderness  SKIN: warm and dry; no rash  NERVOUS SYSTEM:  Awake and alert; Oriented  to place, person and time ; no new deficits    LABS:  07-05    135  |  105  |  16  ----------------------------<  123<H>  4.3   |  23  |  0.85    Ca    9.1      05 Jul 2021 07:04      Creatinine Trend: 0.85 <--, 0.92 <--, 0.83 <--, 0.89 <--, 0.84 <--, 0.84 <--, 0.87 <--                        14.3   8.72  )-----------( 630      ( 05 Jul 2021 07:04 )             42.3     Urine Studies:                                INTERPRETATION:  Portable chest radiograph    CLINICAL INFORMATION: Pleural effusion.    TECHNIQUE:  Portable  AP view of the chest was obtained.    COMPARISON: 6/30/2021 available for review.    FINDINGS:  LEFT Pleurx catheter tip overlies LEFT lung base.    The lungs show residual LEFT lateral wall loculated air-fluid filled effusion.  There is medial basilar airspace consolidation/LEFT lower lobe atelectasis obscuring medial diaphragm contour.  No pneumothorax.      Cardiac chambers grossly normal in size as seen on prior CT scan 6/25/2021.  . Visualized osseous structures are intact.    IMPRESSION:   Suspect residual LEFT lateral wall loculated air-fluid filled effusion.  LEFT medial basilar airspace consolidation/LEFT lower lobe atelectasis.  Pleurx catheter overlies LEFT lung base.        FOLLOW-UP AP PORTABLE CHEST RADIOGRAPH 7/1/2021 AT 2:06 PM    No interval change.            AMY MCCARTY MD; Attending Radiologist  This document has been electronically signed. Jul 1 2021  4:53PM    < end of copied text >  < from: CT Chest w/ IV Cont (06.25.21 @ 15:03) >    EXAM:  CT CHEST IC                            PROCEDURE DATE:  06/25/2021          INTERPRETATION:  CLINICAL INFORMATION: Abnormal chest x-ray. Large left pleural effusion.    COMPARISON: None.    CONTRAST/COMPLICATIONS:  IV Contrast: Omnipaque 350 40 cc administered   60 cc discarded  Oral Contrast: NONE  Complications: None reported at time of study completion    PROCEDURE:  CT of the Chest was performed.  Sagittal and coronal reformats were performed.    FINDINGS: Respiratory motion artifact limits evaluation.    LUNGS AND AIRWAYS: Patent central airways.  Small calcified granuloma in the right lung. Near-complete compressive atelectasis of the left lower lobe. Partial compressive atelectasis of the left upper lobe. Complete compressiveatelectasis of the lingula. Mild emphysematous changes in both lungs, compatible with COPD.  PLEURA: Large left pleural effusion. No evidence for pneumothorax.  MEDIASTINUM AND AKIRA: Mildly enlarged 1.1 cm subcarinal lymph node. Right shift of the mediastinum due to the large left pleural effusion.  VESSELS: Ascending aortic aneurysm measuring 4.1 cm in diameter. 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 No evidence for aortic dissection. Small aortic calcifications.  HEART: Heart size is normal. No pericardial effusion.  CHEST WALL AND LOWER NECK: Within normal limits.  VISUALIZED UPPER ABDOMEN: Small hypodense lesions in the liver, too small to characterize. Colonic diverticulosis.  BONES: Sclerotic lesion in the left posterior fifth rib. Small sclerotic lesion in T2 vertebra. If clinically indicated, bone scan may be pursued for further evaluation.    IMPRESSION:  Large left pleural effusion with associated atelectasis of the left lung as described above. Associated rightshift of the mediastinum.    Mildly enlarged subcarinal lymph node.    Ascending aortic aneurysm measuring 4.1 cm in diameter. 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    Mild COPD.    Sclerotic lesion in the left posterior fifth rib. Small sclerotic lesion in T2 vertebra. If clinically indicated, bone scan may be pursued for further evaluation.            MANDO HERNANDEZ MD; Attending Radiologist  This document has been electronically signed. Jun 25 2021  3:35PM    < end of copied text >    Imaging Personally Reviewed:  YES  Consultant(s) Notes Reviewed:   YES    Care Discussed with Consultants :  pulmonology / thoracic surgery/ heme/oncology     Plan of care was discussed with patient and /or primary care giver; all questions and concerns were addressed and care was aligned with patient's wishes.

## 2021-07-05 NOTE — PROGRESS NOTE ADULT - SUBJECTIVE AND OBJECTIVE BOX
no fever  cough less  sob at times    MEDICATIONS  (STANDING):  heparin   Injectable 5000 Unit(s) SubCutaneous every 8 hours  nicotine -  14 mG/24Hr(s) Patch 1 patch Transdermal daily    MEDICATIONS  (PRN):  acetaminophen    Suspension .. 650 milliGRAM(s) Oral every 6 hours PRN Mild Pain (1 - 3)  ALBUTerol    90 MICROgram(s) HFA Inhaler 2 Puff(s) Inhalation every 6 hours PRN Shortness of Breath and/or Wheezing  guaiFENesin Oral Liquid (Sugar-Free) 100 milliGRAM(s) Oral every 6 hours PRN Cough  oxycodone    5 mG/acetaminophen 325 mG 1 Tablet(s) Oral every 8 hours PRN Moderate Pain (4 - 6)      Allergies    No Known Allergies    Intolerances        Vital Signs Last 24 Hrs  T(C): 36.6 (05 Jul 2021 06:00), Max: 36.7 (04 Jul 2021 12:20)  T(F): 97.9 (05 Jul 2021 06:00), Max: 98.1 (04 Jul 2021 12:20)  HR: 85 (05 Jul 2021 06:00) (83 - 86)  BP: 99/66 (05 Jul 2021 06:00) (99/66 - 128/80)  BP(mean): 92 (04 Jul 2021 12:20) (92 - 92)  RR: 18 (05 Jul 2021 06:00) (18 - 18)  SpO2: 96% (05 Jul 2021 06:00) (96% - 97%)    PHYSICAL EXAM  General: adult in NAD  HEENT: clear oropharynx, anicteric sclera, pink conjunctiva  Neck: supple  CV: normal S1/S2 with no murmur rubs or gallops  Lungs: positive air movement b/l ant lungs,clear to auscultation, no wheezes, no rales  Abdomen: soft non-tender non-distended, no hepatosplenomegaly  Ext: no clubbing cyanosis or edema  Skin: no rashes and no petechiae  Neuro: alert and oriented X 4, no focal deficits  LABS:                          14.3   8.72  )-----------( 630      ( 05 Jul 2021 07:04 )             42.3         Mean Cell Volume : 88.1 fl  Mean Cell Hemoglobin : 29.8 pg  Mean Cell Hemoglobin Concentration : 33.8 gm/dL  Auto Neutrophil # : x  Auto Lymphocyte # : x  Auto Monocyte # : x  Auto Eosinophil # : x  Auto Basophil # : x  Auto Neutrophil % : x  Auto Lymphocyte % : x  Auto Monocyte % : x  Auto Eosinophil % : x  Auto Basophil % : x    Serial CBC  Hematocrit 42.3  Hemoglobin 14.3  Plat 630  RBC 4.80  WBC 8.72  Serial CBC  Hematocrit 43.6  Hemoglobin 14.8  Plat 641  RBC 4.96  WBC 10.30  Serial CBC  Hematocrit 43.1  Hemoglobin 14.5  Plat 655  RBC 4.93  WBC 8.30  Serial CBC  Hematocrit 45.1  Hemoglobin 15.3  Plat 640  RBC 5.12  WBC 9.32    07-05    135  |  105  |  16  ----------------------------<  123<H>  4.3   |  23  |  0.85    Ca    9.1      05 Jul 2021 07:04                      BLOOD SMEAR INTERPRETATION:       RADIOLOGY & ADDITIONAL STUDIES:

## 2021-07-06 DIAGNOSIS — Z71.89 OTHER SPECIFIED COUNSELING: ICD-10-CM

## 2021-07-06 DIAGNOSIS — Z02.9 ENCOUNTER FOR ADMINISTRATIVE EXAMINATIONS, UNSPECIFIED: ICD-10-CM

## 2021-07-06 DIAGNOSIS — F17.200 NICOTINE DEPENDENCE, UNSPECIFIED, UNCOMPLICATED: ICD-10-CM

## 2021-07-06 PROCEDURE — 99232 SBSQ HOSP IP/OBS MODERATE 35: CPT

## 2021-07-06 PROCEDURE — 71045 X-RAY EXAM CHEST 1 VIEW: CPT | Mod: 26

## 2021-07-06 RX ADMIN — Medication 1 PATCH: at 07:06

## 2021-07-06 RX ADMIN — Medication 1 PATCH: at 12:25

## 2021-07-06 RX ADMIN — Medication 1 PATCH: at 19:00

## 2021-07-06 RX ADMIN — Medication 100 MILLIGRAM(S): at 12:25

## 2021-07-06 RX ADMIN — HEPARIN SODIUM 5000 UNIT(S): 5000 INJECTION INTRAVENOUS; SUBCUTANEOUS at 13:26

## 2021-07-06 RX ADMIN — HEPARIN SODIUM 5000 UNIT(S): 5000 INJECTION INTRAVENOUS; SUBCUTANEOUS at 22:48

## 2021-07-06 RX ADMIN — HEPARIN SODIUM 5000 UNIT(S): 5000 INJECTION INTRAVENOUS; SUBCUTANEOUS at 06:03

## 2021-07-06 RX ADMIN — Medication 1 PATCH: at 12:26

## 2021-07-06 NOTE — PROGRESS NOTE ADULT - SUBJECTIVE AND OBJECTIVE BOX
NP Note discussed with  Primary Attending    INTERVAL HPI/OVERNIGHT EVENTS: no new complaints    MEDICATIONS  (STANDING):  heparin   Injectable 5000 Unit(s) SubCutaneous every 8 hours  nicotine -  14 mG/24Hr(s) Patch 1 patch Transdermal daily    MEDICATIONS  (PRN):  acetaminophen    Suspension .. 650 milliGRAM(s) Oral every 6 hours PRN Mild Pain (1 - 3)  ALBUTerol    90 MICROgram(s) HFA Inhaler 2 Puff(s) Inhalation every 6 hours PRN Shortness of Breath and/or Wheezing  guaiFENesin Oral Liquid (Sugar-Free) 100 milliGRAM(s) Oral every 6 hours PRN Cough      __________________________________________________  REVIEW OF SYSTEMS:    CONSTITUTIONAL: No fever,   EYES: no acute visual disturbances  NECK: No pain or stiffness  RESPIRATORY: No cough; No shortness of breath  CARDIOVASCULAR: No chest pain, no palpitations  GASTROINTESTINAL: No pain. No nausea or vomiting; No diarrhea   NEUROLOGICAL: No headache or numbness, no tremors  MUSCULOSKELETAL: No joint pain, no muscle pain  GENITOURINARY: no dysuria, no frequency, no hesitancy  PSYCHIATRY: no depression , no anxiety  ALL OTHER  ROS negative        Vital Signs Last 24 Hrs  T(C): 36.2 (06 Jul 2021 12:55), Max: 36.4 (05 Jul 2021 21:48)  T(F): 97.1 (06 Jul 2021 12:55), Max: 97.5 (05 Jul 2021 21:48)  HR: 90 (06 Jul 2021 12:55) (79 - 90)  BP: 120/83 (06 Jul 2021 12:55) (102/64 - 120/83)  BP(mean): --  RR: 17 (06 Jul 2021 12:55) (16 - 17)  SpO2: 97% (06 Jul 2021 12:55) (95% - 98%)    ________________________________________________  PHYSICAL EXAM:  GENERAL: NAD  HEENT: Normocephalic;  conjunctivae and sclerae clear; moist mucous membranes;   NECK : supple  CHEST/LUNG: Clear to auscultation bilaterally with fair air entry left chest pleurx cath in place.   HEART: S1 S2  regular; no murmurs, gallops or rubs  ABDOMEN: Soft, Nontender, Nondistended; Bowel sounds present  EXTREMITIES: no cyanosis; no edema; no calf tenderness  SKIN: warm and dry; no rash  NERVOUS SYSTEM:  Awake and alert; Oriented  to place, person and time ; no new deficits    _________________________________________________  LABS:                        14.3   8.72  )-----------( 630      ( 05 Jul 2021 07:04 )             42.3     07-05    135  |  105  |  16  ----------------------------<  123<H>  4.3   |  23  |  0.85    Ca    9.1      05 Jul 2021 07:04          CAPILLARY BLOOD GLUCOSE            RADIOLOGY & ADDITIONAL TESTS:    Imaging  Reviewed:  YES    < from: Xray Chest 1 View- PORTABLE-Routine (Xray Chest 1 View- PORTABLE-Routine in AM.) (07.03.21 @ 09:27) >    EXAM:  XR CHEST PORTABLE ROUTINE 1V                            PROCEDURE DATE:  07/03/2021          INTERPRETATION:  Portable chest radiograph    CLINICAL INFORMATION: Pleural effusion. Follow-up    TECHNIQUE:  Portable  AP view of the chest was obtained.    COMPARISON: 7/2/2021 chest available for review.    FINDINGS:    The lungs show residual LEFT lateral wall loculated air-fluid filled effusion.  There is medial basilar airspace consolidation/LEFT lower lobe atelectasis obscuring medial diaphragm contour.  No pneumothorax.  RIGHT lung parenchyma clear.    Cardiac chambers grossly normal in size.    Visualized osseous structures are intact.    IMPRESSION: No interval change.  Residual LEFT lateral wall loculated air-fluid filled effusion.  LEFT medial basilar airspace consolidation/LEFT lower lobe atelectasis.  Pleurx catheter overlies LEFT lung base.    Follow-up chest CT scan recommended for confirmation of loculated LEFT lateral wall fluid collection versus other etiologies              AMY MCCARTY MD; Attending Radiologist  This document has been electronically signed. Jul 4 2021 11:35AM    < end of copied text >    < from: CT Chest w/ IV Cont (06.25.21 @ 15:03) >    EXAM:  CT CHEST IC                            PROCEDURE DATE:  06/25/2021          INTERPRETATION:  CLINICAL INFORMATION: Abnormal chest x-ray. Large left pleural effusion.    COMPARISON: None.    CONTRAST/COMPLICATIONS:  IV Contrast: Omnipaque 350 40 cc administered   60 cc discarded  Oral Contrast: NONE  Complications: None reported at time of study completion    PROCEDURE:  CT of the Chest was performed.  Sagittal and coronal reformats were performed.    FINDINGS: Respiratory motion artifact limits evaluation.    LUNGS AND AIRWAYS: Patent central airways.  Small calcified granuloma in the right lung. Near-complete compressive atelectasis of the left lower lobe. Partial compressive atelectasis of the left upper lobe. Complete compressiveatelectasis of the lingula. Mild emphysematous changes in both lungs, compatible with COPD.  PLEURA: Large left pleural effusion. No evidence for pneumothorax.  MEDIASTINUM AND AKIRA: Mildly enlarged 1.1 cm subcarinal lymph node. Right shift of the mediastinum due to the large left pleural effusion.  VESSELS: Ascending aortic aneurysm measuring 4.1 cm in diameter. 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 No evidence for aortic dissection. Small aortic calcifications.  HEART: Heart size is normal. No pericardial effusion.  CHEST WALL AND LOWER NECK: Within normal limits.  VISUALIZED UPPER ABDOMEN: Small hypodense lesions in the liver, too small to characterize. Colonic diverticulosis.  BONES: Sclerotic lesion in the left posterior fifth rib. Small sclerotic lesion in T2 vertebra. If clinically indicated, bone scan may be pursued for further evaluation.    IMPRESSION:  Large left pleural effusion with associated atelectasis of the left lung as described above. Associated rightshift of the mediastinum.    Mildly enlarged subcarinal lymph node.    Ascending aortic aneurysm measuring 4.1 cm in diameter. 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    Mild COPD.    Sclerotic lesion in the left posterior fifth rib. Small sclerotic lesion in T2 vertebra. If clinically indicated, bone scan may be pursued for further evaluation.            MANDO HERNANDEZ MD; Attending Radiologist  This document has been electronically signed. Jun 25 2021  3:35PM    < end of copied text >    Consultant(s) Notes Reviewed:   YES      Plan of care was discussed with patient and /or primary care giver; all questions and concerns were addressed

## 2021-07-06 NOTE — PROGRESS NOTE ADULT - PROBLEM SELECTOR PLAN 2
Not on any medications at home  Continue to monitor

## 2021-07-06 NOTE — PROGRESS NOTE ADULT - ASSESSMENT
67 y/o male with a PMH of HTN, HLD, smoker  presents to the ED with c/o SOB, cough and chest tightness for the past 2 weeks. Also reported  night sweats and chills for one week   CT Chest w/ IV Cont Large left pleural effusion with associated atelectasis of the left lung as described above. Associated rightshift of the mediastinum. Mildly enlarged subcarinal lymph node.  Admitted for pleural effusion r/o malignancy, r/o TB   TB -  as AFB  negative x 2 times, 3rd AFB sent this morning.   thoracic surgery is following, underwent  left VATS with pleurx cath 6/28 then capping 7/1 but changed to wall suction again on same day due to  Left pneumothorax  largely filled in with fluid from CXR. Repeat CXR shows no pneumothorax.   cytology from 6/28 is negative for malignant, still pending pleural biopsy surgical path.     Pt was seen at bedside, NAD, breathing easily, c/o  mild pain to chest tube insertion site  when he cough, continue with tylenol and percocet.   Followed by pulmonary and TS team. recommended drain 3 x week with VNS service. Called TS today for evaluation with drain. Heme/onc following.    72.5

## 2021-07-06 NOTE — PROGRESS NOTE ADULT - PROBLEM SELECTOR PROBLEM 1
Dyspnea, unspecified type
Pleural effusion
Pleural effusion
Dyspnea, unspecified type
Pleural effusion
Dyspnea, unspecified type
Pleural effusion

## 2021-07-06 NOTE — PROGRESS NOTE ADULT - ASSESSMENT
· Assessment	  66 year old male, smoker, presented with cough, sob, and chest tightness for 2 weeks.  It is getting worse.  CT chest showed very large left effusion    1. large left effusion in a smoker  highly suspicious for malig  will do chest tube  because of right shift of mediastinum  had VATS  will check cytology of effusion  cytology neg  await path from pleural biopsy  the pleurx needs to be drained    2. bone mets  will check CEA, PSA  CEA is high  more likely to be lung ca

## 2021-07-06 NOTE — PROGRESS NOTE ADULT - PROBLEM SELECTOR PROBLEM 3
Prophylactic measure
Prophylactic measure
HLD (hyperlipidemia)
Prophylactic measure
HLD (hyperlipidemia)
Prophylactic measure
Smoking

## 2021-07-06 NOTE — PROGRESS NOTE ADULT - SUBJECTIVE AND OBJECTIVE BOX
condition same  no sob  no fever  some dry cough    MEDICATIONS  (STANDING):  heparin   Injectable 5000 Unit(s) SubCutaneous every 8 hours  nicotine -  14 mG/24Hr(s) Patch 1 patch Transdermal daily    MEDICATIONS  (PRN):  acetaminophen    Suspension .. 650 milliGRAM(s) Oral every 6 hours PRN Mild Pain (1 - 3)  ALBUTerol    90 MICROgram(s) HFA Inhaler 2 Puff(s) Inhalation every 6 hours PRN Shortness of Breath and/or Wheezing  guaiFENesin Oral Liquid (Sugar-Free) 100 milliGRAM(s) Oral every 6 hours PRN Cough      Allergies    No Known Allergies    Intolerances        Vital Signs Last 24 Hrs  T(C): 36.3 (06 Jul 2021 05:13), Max: 36.4 (05 Jul 2021 21:48)  T(F): 97.3 (06 Jul 2021 05:13), Max: 97.5 (05 Jul 2021 21:48)  HR: 79 (06 Jul 2021 05:13) (79 - 89)  BP: 102/64 (06 Jul 2021 05:13) (102/64 - 118/80)  BP(mean): --  RR: 16 (06 Jul 2021 05:13) (16 - 16)  SpO2: 95% (06 Jul 2021 05:13) (95% - 98%)    PHYSICAL EXAM  General: adult in NAD  HEENT: clear oropharynx, anicteric sclera, pink conjunctiva  Neck: supple  CV: normal S1/S2 with no murmur rubs or gallops  Lungs: positive air movement b/l ant lungs,clear to auscultation, no wheezes, no rales  Abdomen: soft non-tender non-distended, no hepatosplenomegaly  Ext: no clubbing cyanosis or edema  Skin: no rashes and no petechiae  Neuro: alert and oriented X 4, no focal deficits  LABS:                          14.3   8.72  )-----------( 630      ( 05 Jul 2021 07:04 )             42.3         Mean Cell Volume : 88.1 fl  Mean Cell Hemoglobin : 29.8 pg  Mean Cell Hemoglobin Concentration : 33.8 gm/dL  Auto Neutrophil # : x  Auto Lymphocyte # : x  Auto Monocyte # : x  Auto Eosinophil # : x  Auto Basophil # : x  Auto Neutrophil % : x  Auto Lymphocyte % : x  Auto Monocyte % : x  Auto Eosinophil % : x  Auto Basophil % : x    Serial CBC  Hematocrit 42.3  Hemoglobin 14.3  Plat 630  RBC 4.80  WBC 8.72  Serial CBC  Hematocrit 43.6  Hemoglobin 14.8  Plat 641  RBC 4.96  WBC 10.30  Serial CBC  Hematocrit 43.1  Hemoglobin 14.5  Plat 655  RBC 4.93  WBC 8.30    07-05    135  |  105  |  16  ----------------------------<  123<H>  4.3   |  23  |  0.85    Ca    9.1      05 Jul 2021 07:04                      BLOOD SMEAR INTERPRETATION:       RADIOLOGY & ADDITIONAL STUDIES:

## 2021-07-06 NOTE — PROGRESS NOTE ADULT - PROBLEM SELECTOR PLAN 4
Heparin subcutaneous for DVT ppx
IMPROVE VTE Individual Risk Assessment  RISK                                                                Points  [  ] Previous VTE                                                  3  [  ] Thrombophilia                                               2  [  ] Lower limb paralysis                                      2        (unable to hold up >15 seconds)    [  ] Current Cancer                                              2         (within 6 months)  [x  ] Immobilization > 24 hrs                                1  [  ] ICU/CCU stay > 24 hours                              1  [x  ] Age > 60                                                      1  IMPROVE VTE Score _________2, -- for DVT proph    Will start on heparin SubQ
IMPROVE VTE Individual Risk Assessment  RISK                                                                Points  [  ] Previous VTE                                                  3  [  ] Thrombophilia                                               2  [  ] Lower limb paralysis                                      2        (unable to hold up >15 seconds)    [  ] Current Cancer                                              2         (within 6 months)  [x  ] Immobilization > 24 hrs                                1  [  ] ICU/CCU stay > 24 hours                              1  [x  ] Age > 60                                                      1  IMPROVE VTE Score _________2, -- for DVT proph    Will start on heparin SubQ

## 2021-07-06 NOTE — PROGRESS NOTE ADULT - PROBLEM SELECTOR PLAN 1
Malignancy vs PNA sp VATS with pleurx cath 6/28  maintaining PleurX catheter on suction until today  per thoracic due to repeat chest x-ray  showed  Left pneumothorax  largely filled in with fluid on 7/1   Continue Rocephin and Azithro empirically  - completing 7days course today   Follow up AFB x 3 (AFB  negative x 2 )  3td AFB sent 7/2    pulmonary Dr. Mendez on board   Dr. López hem/onc on board  thoracic surgery on board  repeat CXR to monitor status of pneumothorax as above  TS recommended drain 3 x week with VNS service, CM following

## 2021-07-07 ENCOUNTER — TRANSCRIPTION ENCOUNTER (OUTPATIENT)
Age: 66
End: 2021-07-07

## 2021-07-07 VITALS
TEMPERATURE: 97 F | DIASTOLIC BLOOD PRESSURE: 66 MMHG | RESPIRATION RATE: 16 BRPM | SYSTOLIC BLOOD PRESSURE: 119 MMHG | OXYGEN SATURATION: 96 % | HEART RATE: 90 BPM

## 2021-07-07 PROCEDURE — 71260 CT THORAX DX C+: CPT

## 2021-07-07 PROCEDURE — 87015 SPECIMEN INFECT AGNT CONCNTJ: CPT

## 2021-07-07 PROCEDURE — 94640 AIRWAY INHALATION TREATMENT: CPT

## 2021-07-07 PROCEDURE — 80061 LIPID PANEL: CPT

## 2021-07-07 PROCEDURE — 93005 ELECTROCARDIOGRAM TRACING: CPT

## 2021-07-07 PROCEDURE — 87206 SMEAR FLUORESCENT/ACID STAI: CPT

## 2021-07-07 PROCEDURE — 36415 COLL VENOUS BLD VENIPUNCTURE: CPT

## 2021-07-07 PROCEDURE — 85730 THROMBOPLASTIN TIME PARTIAL: CPT

## 2021-07-07 PROCEDURE — 86900 BLOOD TYPING SEROLOGIC ABO: CPT

## 2021-07-07 PROCEDURE — G0103: CPT

## 2021-07-07 PROCEDURE — 80053 COMPREHEN METABOLIC PANEL: CPT

## 2021-07-07 PROCEDURE — 82306 VITAMIN D 25 HYDROXY: CPT

## 2021-07-07 PROCEDURE — 83735 ASSAY OF MAGNESIUM: CPT

## 2021-07-07 PROCEDURE — 87070 CULTURE OTHR SPECIMN AEROBIC: CPT

## 2021-07-07 PROCEDURE — 85027 COMPLETE CBC AUTOMATED: CPT

## 2021-07-07 PROCEDURE — 86850 RBC ANTIBODY SCREEN: CPT

## 2021-07-07 PROCEDURE — 85610 PROTHROMBIN TIME: CPT

## 2021-07-07 PROCEDURE — 84484 ASSAY OF TROPONIN QUANT: CPT

## 2021-07-07 PROCEDURE — 71045 X-RAY EXAM CHEST 1 VIEW: CPT

## 2021-07-07 PROCEDURE — 88305 TISSUE EXAM BY PATHOLOGIST: CPT

## 2021-07-07 PROCEDURE — 84443 ASSAY THYROID STIM HORMONE: CPT

## 2021-07-07 PROCEDURE — 86480 TB TEST CELL IMMUN MEASURE: CPT

## 2021-07-07 PROCEDURE — 80048 BASIC METABOLIC PNL TOTAL CA: CPT

## 2021-07-07 PROCEDURE — 86769 SARS-COV-2 COVID-19 ANTIBODY: CPT

## 2021-07-07 PROCEDURE — 87641 MR-STAPH DNA AMP PROBE: CPT

## 2021-07-07 PROCEDURE — 86901 BLOOD TYPING SEROLOGIC RH(D): CPT

## 2021-07-07 PROCEDURE — 82962 GLUCOSE BLOOD TEST: CPT

## 2021-07-07 PROCEDURE — 84100 ASSAY OF PHOSPHORUS: CPT

## 2021-07-07 PROCEDURE — 87102 FUNGUS ISOLATION CULTURE: CPT

## 2021-07-07 PROCEDURE — 99285 EMERGENCY DEPT VISIT HI MDM: CPT

## 2021-07-07 PROCEDURE — 82378 CARCINOEMBRYONIC ANTIGEN: CPT

## 2021-07-07 PROCEDURE — 86803 HEPATITIS C AB TEST: CPT

## 2021-07-07 PROCEDURE — 87640 STAPH A DNA AMP PROBE: CPT

## 2021-07-07 PROCEDURE — 85025 COMPLETE CBC W/AUTO DIFF WBC: CPT

## 2021-07-07 PROCEDURE — 86923 COMPATIBILITY TEST ELECTRIC: CPT

## 2021-07-07 PROCEDURE — 87635 SARS-COV-2 COVID-19 AMP PRB: CPT

## 2021-07-07 PROCEDURE — 88112 CYTOPATH CELL ENHANCE TECH: CPT

## 2021-07-07 PROCEDURE — 87116 MYCOBACTERIA CULTURE: CPT

## 2021-07-07 RX ORDER — SIMVASTATIN 20 MG/1
1 TABLET, FILM COATED ORAL
Qty: 0 | Refills: 0 | DISCHARGE

## 2021-07-07 RX ORDER — CLOPIDOGREL BISULFATE 75 MG/1
75 TABLET, FILM COATED ORAL DAILY
Refills: 0 | Status: DISCONTINUED | OUTPATIENT
Start: 2021-07-07 | End: 2021-07-07

## 2021-07-07 RX ORDER — SIMVASTATIN 20 MG/1
20 TABLET, FILM COATED ORAL AT BEDTIME
Refills: 0 | Status: DISCONTINUED | OUTPATIENT
Start: 2021-07-07 | End: 2021-07-07

## 2021-07-07 RX ORDER — NICOTINE POLACRILEX 2 MG
1 GUM BUCCAL
Qty: 14 | Refills: 0
Start: 2021-07-07 | End: 2021-07-20

## 2021-07-07 RX ORDER — AMLODIPINE BESYLATE 2.5 MG/1
10 TABLET ORAL DAILY
Refills: 0 | Status: DISCONTINUED | OUTPATIENT
Start: 2021-07-07 | End: 2021-07-07

## 2021-07-07 RX ORDER — CLOPIDOGREL BISULFATE 75 MG/1
1 TABLET, FILM COATED ORAL
Qty: 0 | Refills: 0 | DISCHARGE

## 2021-07-07 RX ORDER — AMLODIPINE BESYLATE 2.5 MG/1
1 TABLET ORAL
Qty: 0 | Refills: 0 | DISCHARGE

## 2021-07-07 RX ORDER — ALBUTEROL 90 UG/1
2 AEROSOL, METERED ORAL
Qty: 1 | Refills: 0
Start: 2021-07-07 | End: 2021-07-20

## 2021-07-07 RX ADMIN — Medication 1 PATCH: at 12:01

## 2021-07-07 RX ADMIN — HEPARIN SODIUM 5000 UNIT(S): 5000 INJECTION INTRAVENOUS; SUBCUTANEOUS at 05:44

## 2021-07-07 RX ADMIN — Medication 1 PATCH: at 05:48

## 2021-07-07 RX ADMIN — Medication 1 PATCH: at 12:03

## 2021-07-07 RX ADMIN — HEPARIN SODIUM 5000 UNIT(S): 5000 INJECTION INTRAVENOUS; SUBCUTANEOUS at 13:55

## 2021-07-07 NOTE — DISCHARGE NOTE NURSING/CASE MANAGEMENT/SOCIAL WORK - PATIENT PORTAL LINK FT
You can access the FollowMyHealth Patient Portal offered by [facility] by registering at the following website: [email or web address]. By joining [employer]’s FollowMyHealth portal, you will also be able to view your health information using other applications (apps) compatible with our system.

## 2021-07-07 NOTE — PROGRESS NOTE ADULT - SUBJECTIVE AND OBJECTIVE BOX
condition stable  no sob      MEDICATIONS  (STANDING):  heparin   Injectable 5000 Unit(s) SubCutaneous every 8 hours  nicotine -  14 mG/24Hr(s) Patch 1 patch Transdermal daily    MEDICATIONS  (PRN):  acetaminophen    Suspension .. 650 milliGRAM(s) Oral every 6 hours PRN Mild Pain (1 - 3)  ALBUTerol    90 MICROgram(s) HFA Inhaler 2 Puff(s) Inhalation every 6 hours PRN Shortness of Breath and/or Wheezing  guaiFENesin Oral Liquid (Sugar-Free) 100 milliGRAM(s) Oral every 6 hours PRN Cough      Allergies    No Known Allergies    Intolerances        Vital Signs Last 24 Hrs  T(C): 36.3 (07 Jul 2021 05:46), Max: 36.4 (06 Jul 2021 21:26)  T(F): 97.3 (07 Jul 2021 05:46), Max: 97.6 (06 Jul 2021 21:26)  HR: 84 (07 Jul 2021 05:46) (84 - 90)  BP: 109/69 (07 Jul 2021 05:46) (109/69 - 122/79)  BP(mean): --  RR: 16 (07 Jul 2021 05:46) (16 - 17)  SpO2: 95% (07 Jul 2021 05:46) (95% - 97%)    PHYSICAL EXAM  General: adult in NAD  HEENT: clear oropharynx, anicteric sclera, pink conjunctiva  Neck: supple  CV: normal S1/S2 with no murmur rubs or gallops  Lungs: positive air movement b/l ant lungs,clear to auscultation, no wheezes, no rales  Abdomen: soft non-tender non-distended, no hepatosplenomegaly  Ext: no clubbing cyanosis or edema  Skin: no rashes and no petechiae  Neuro: alert and oriented X 4, no focal deficits  LABS:          Serial CBC  Hematocrit 42.3  Hemoglobin 14.3  Plat 630  RBC 4.80  WBC 8.72  Serial CBC  Hematocrit 43.6  Hemoglobin 14.8  Plat 641  RBC 4.96  WBC 10.30                          BLOOD SMEAR INTERPRETATION:       RADIOLOGY & ADDITIONAL STUDIES:

## 2021-07-07 NOTE — PROGRESS NOTE ADULT - NSICDXPILOT_GEN_ALL_CORE
[place]

## 2021-07-07 NOTE — PROGRESS NOTE ADULT - NSTELEHEALTH_GEN_ALL_CORE
No
No adenopathy or splenomegaly. No cervical or inguinal lymphadenopathy.

## 2021-07-07 NOTE — PROGRESS NOTE ADULT - ASSESSMENT
· Assessment	  66 year old male, smoker, presented with cough, sob, and chest tightness for 2 weeks.  It is getting worse.  CT chest showed very large left effusion    1. large left effusion in a smoker  highly suspicious for malig  will do chest tube  because of right shift of mediastinum  had VATS  will check cytology of effusion  cytology neg  await path from pleural biopsy  preliminary, necrotizing granuloma, no cancer seen.  AFB neg so far  the pleurx needs to be drained    2. bone mets  will check CEA, PSA  CEA is high  more likely to be lung ca · Assessment	  66 year old male, smoker, presented with cough, sob, and chest tightness for 2 weeks.  It is getting worse.  CT chest showed very large left effusion    1. large left effusion in a smoker  highly suspicious for malig  will do chest tube  because of right shift of mediastinum  had VATS  will check cytology of effusion  cytology neg  await path from pleural biopsy  preliminary, necrotizing granuloma, no cancer seen.  AFB neg so far  the pleurx needs to be drained    2. bone mets  will check CEA, PSA  CEA is normal  lung ca vs mesothelioma

## 2021-07-07 NOTE — PROGRESS NOTE ADULT - NUTRITIONAL ASSESSMENT
This patient has been assessed with a concern for Malnutrition and has been determined to have a diagnosis/diagnoses of Moderate protein-calorie malnutrition.    This patient is being managed with:   Diet Regular-  DASH/TLC {Sodium & Cholesterol Restricted}  Entered: Jun 28 2021  7:13PM    
This patient has been assessed with a concern for Malnutrition and has been determined to have a diagnosis/diagnoses of Moderate protein-calorie malnutrition.    This patient is being managed with:   Diet Regular-  DASH/TLC {Sodium & Cholesterol Restricted}  Entered: Jun 28 2021  7:13PM      This patient has been assessed with a concern for Malnutrition and has been determined to have a diagnosis/diagnoses of Moderate protein-calorie malnutrition.    This patient is being managed with:   Diet Regular-  DASH/TLC {Sodium & Cholesterol Restricted}  Entered: Jun 28 2021  7:13PM

## 2021-07-07 NOTE — PROGRESS NOTE ADULT - REASON FOR ADMISSION
Cough and Dyspnea

## 2021-07-07 NOTE — PROGRESS NOTE ADULT - PROVIDER SPECIALTY LIST ADULT
Heme/Onc
Pulmonology
Pulmonology
Thoracic Surgery
Internal Medicine
Pulmonology
Pulmonology
Thoracic Surgery
Thoracic Surgery
Heme/Onc
Pulmonology
Pulmonology
Surgery
Surgery
Thoracic Surgery
Heme/Onc
Internal Medicine
Pulmonology
Heme/Onc
Thoracic Surgery
Heme/Onc
Heme/Onc
Internal Medicine

## 2021-07-08 PROBLEM — Z00.00 ENCOUNTER FOR PREVENTIVE HEALTH EXAMINATION: Status: ACTIVE | Noted: 2021-07-08

## 2021-07-08 PROBLEM — F17.200 NICOTINE DEPENDENCE, UNSPECIFIED, UNCOMPLICATED: Chronic | Status: ACTIVE | Noted: 2021-06-29

## 2021-07-08 PROBLEM — I10 ESSENTIAL (PRIMARY) HYPERTENSION: Chronic | Status: ACTIVE | Noted: 2021-06-25

## 2021-07-08 PROBLEM — E78.5 HYPERLIPIDEMIA, UNSPECIFIED: Chronic | Status: ACTIVE | Noted: 2021-06-25

## 2021-07-13 ENCOUNTER — APPOINTMENT (OUTPATIENT)
Dept: THORACIC SURGERY | Facility: CLINIC | Age: 66
End: 2021-07-13
Payer: MEDICAID

## 2021-07-13 ENCOUNTER — APPOINTMENT (OUTPATIENT)
Dept: PULMONOLOGY | Facility: CLINIC | Age: 66
End: 2021-07-13
Payer: MEDICAID

## 2021-07-13 VITALS
WEIGHT: 150 LBS | SYSTOLIC BLOOD PRESSURE: 119 MMHG | HEIGHT: 67.72 IN | HEART RATE: 90 BPM | DIASTOLIC BLOOD PRESSURE: 80 MMHG | BODY MASS INDEX: 23 KG/M2 | OXYGEN SATURATION: 98 %

## 2021-07-13 VITALS
DIASTOLIC BLOOD PRESSURE: 80 MMHG | BODY MASS INDEX: 23 KG/M2 | OXYGEN SATURATION: 98 % | HEIGHT: 67.72 IN | HEART RATE: 90 BPM | SYSTOLIC BLOOD PRESSURE: 119 MMHG | WEIGHT: 150 LBS

## 2021-07-13 PROCEDURE — 94729 DIFFUSING CAPACITY: CPT

## 2021-07-13 PROCEDURE — 94010 BREATHING CAPACITY TEST: CPT

## 2021-07-13 PROCEDURE — 99204 OFFICE O/P NEW MOD 45 MIN: CPT | Mod: 25

## 2021-07-13 PROCEDURE — 99215 OFFICE O/P EST HI 40 MIN: CPT

## 2021-07-13 PROCEDURE — 94726 PLETHYSMOGRAPHY LUNG VOLUMES: CPT

## 2021-07-13 PROCEDURE — ZZZZZ: CPT

## 2021-07-13 RX ORDER — PYRIDOXINE HCL (VITAMIN B6) 50 MG
50 TABLET ORAL DAILY
Qty: 30 | Refills: 5 | Status: ACTIVE | COMMUNITY
Start: 2021-07-13 | End: 1900-01-01

## 2021-07-13 RX ORDER — RIFAMPIN 300 MG/1
300 CAPSULE ORAL DAILY
Qty: 1 | Refills: 5 | Status: ACTIVE | COMMUNITY
Start: 2021-07-13 | End: 1900-01-01

## 2021-07-13 RX ORDER — ISONIAZID 300 MG/1
300 TABLET ORAL DAILY
Qty: 30 | Refills: 5 | Status: ACTIVE | COMMUNITY
Start: 2021-07-13 | End: 1900-01-01

## 2021-07-13 RX ORDER — ASPIRIN 81 MG
81 TABLET, DELAYED RELEASE (ENTERIC COATED) ORAL
Refills: 0 | Status: ACTIVE | COMMUNITY

## 2021-07-13 NOTE — ASSESSMENT
[FreeTextEntry1] : 66M with left pleural effusion.\par VATS pleural bx with extensive necrotizing granulomatous inflammation.\par Quant negative which can be with acute infection\par AFB and fungal stains negative but cultures pending\par Pt is from [place]. He had some weight loss and low grade fevers and sweats.\par No evidence of another alternate etiology, negative for malignancy.\par \par At this time, would treat for TB pleuritis with 4 drug regimen and reassess\par Await bx cultures\par Recommend removal of pleurx, not indicated at this time\par Discussed at length risks and benefits of TB therapy, pt and daughter in agreement\par Ophtho referral\par CMP/LFT\par RTC in 2 weeks

## 2021-07-13 NOTE — HISTORY OF PRESENT ILLNESS
[TextBox_4] : 66M here for evaluation of pleural effusion. He was in the hospital at [facility] and had  a large left pleural effusion and VATS with pleurx catheter placement. He is here for evaluation because his pathology revealed necrotizing granulomas. \par Since discharge his effusion has been drained twice and last 2 days ago there was no fluid to drain. Overall he feels less chest pain but persistent dry cough. 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 Quit 1 month ago. \par \par

## 2021-07-13 NOTE — PHYSICAL EXAM
[No Acute Distress] : no acute distress [Normal Oropharynx] : normal oropharynx [Normal Appearance] : normal appearance [No Neck Mass] : no neck mass [Normal Rate/Rhythm] : normal rate/rhythm [Normal S1, S2] : normal s1, s2 [No Murmurs] : no murmurs [No Resp Distress] : no resp distress [No Abnormalities] : no abnormalities [Benign] : benign [Normal Gait] : normal gait [No Clubbing] : no clubbing [No Cyanosis] : no cyanosis [No Edema] : no edema [FROM] : FROM [Normal Color/ Pigmentation] : normal color/ pigmentation [No Focal Deficits] : no focal deficits [Oriented x3] : oriented x3 [Normal Affect] : normal affect [TextBox_68] : diminished BSs at left base, pleurx catheter in place with clean dressing.

## 2021-07-14 ENCOUNTER — NON-APPOINTMENT (OUTPATIENT)
Age: 66
End: 2021-07-14

## 2021-07-16 LAB
ALBUMIN SERPL ELPH-MCNC: 4.1 G/DL
ALP BLD-CCNC: 214 U/L
ALT SERPL-CCNC: 107 U/L
ANION GAP SERPL CALC-SCNC: 16 MMOL/L
AST SERPL-CCNC: 41 U/L
BASOPHILS # BLD AUTO: 0.06 K/UL
BASOPHILS NFR BLD AUTO: 0.7 %
BILIRUB SERPL-MCNC: 0.3 MG/DL
BUN SERPL-MCNC: 21 MG/DL
CALCIUM SERPL-MCNC: 9.6 MG/DL
CHLORIDE SERPL-SCNC: 99 MMOL/L
CO2 SERPL-SCNC: 19 MMOL/L
CREAT SERPL-MCNC: 0.86 MG/DL
EOSINOPHIL # BLD AUTO: 0.17 K/UL
EOSINOPHIL NFR BLD AUTO: 1.9 %
GLUCOSE SERPL-MCNC: 84 MG/DL
HCT VFR BLD CALC: 51.1 %
HGB BLD-MCNC: 16.1 G/DL
IMM GRANULOCYTES NFR BLD AUTO: 0.7 %
LYMPHOCYTES # BLD AUTO: 1.22 K/UL
LYMPHOCYTES NFR BLD AUTO: 13.8 %
M TB IFN-G BLD-IMP: NEGATIVE
MAN DIFF?: NORMAL
MCHC RBC-ENTMCNC: 30.2 PG
MCHC RBC-ENTMCNC: 31.5 GM/DL
MCV RBC AUTO: 95.9 FL
MONOCYTES # BLD AUTO: 1.15 K/UL
MONOCYTES NFR BLD AUTO: 13 %
NEUTROPHILS # BLD AUTO: 6.17 K/UL
NEUTROPHILS NFR BLD AUTO: 69.9 %
PLATELET # BLD AUTO: 516 K/UL
POTASSIUM SERPL-SCNC: 6.6 MMOL/L
PROT SERPL-MCNC: 8 G/DL
QUANTIFERON TB PLUS MITOGEN MINUS NIL: 0.5 IU/ML
QUANTIFERON TB PLUS NIL: 0.1 IU/ML
QUANTIFERON TB PLUS TB1 MINUS NIL: 0.03 IU/ML
QUANTIFERON TB PLUS TB2 MINUS NIL: 0.08 IU/ML
RBC # BLD: 5.33 M/UL
RBC # FLD: 14.3 %
SODIUM SERPL-SCNC: 133 MMOL/L
WBC # FLD AUTO: 8.83 K/UL

## 2021-07-21 ENCOUNTER — APPOINTMENT (OUTPATIENT)
Dept: THORACIC SURGERY | Facility: CLINIC | Age: 66
End: 2021-07-21
Payer: MEDICAID

## 2021-07-21 VITALS
SYSTOLIC BLOOD PRESSURE: 116 MMHG | WEIGHT: 150 LBS | BODY MASS INDEX: 23.54 KG/M2 | DIASTOLIC BLOOD PRESSURE: 78 MMHG | HEIGHT: 67 IN | OXYGEN SATURATION: 99 % | HEART RATE: 102 BPM

## 2021-07-21 VITALS — TEMPERATURE: 97.2 F

## 2021-07-21 PROCEDURE — 99215 OFFICE O/P EST HI 40 MIN: CPT | Mod: 25

## 2021-07-21 PROCEDURE — 32552 REMOVE LUNG CATHETER: CPT

## 2021-07-22 NOTE — PHYSICAL EXAM
[Sclera] : the sclera and conjunctiva were normal [Extraocular Movements] : extraocular movements were intact [Neck Cervical Mass (___cm)] : no neck mass was observed [Exaggerated Use Of Accessory Muscles For Inspiration] : no accessory muscle use [Auscultation Breath Sounds / Voice Sounds] : lungs were clear to auscultation bilaterally [Heart Sounds] : normal S1 and S2 [Abdomen Soft] : soft [Abdomen Tenderness] : non-tender [Cervical Lymph Nodes Enlarged Posterior Bilaterally] : posterior cervical [Cervical Lymph Nodes Enlarged Anterior Bilaterally] : anterior cervical [Supraclavicular Lymph Nodes Enlarged Bilaterally] : supraclavicular [No CVA Tenderness] : no ~M costovertebral angle tenderness [Abnormal Walk] : normal gait [Skin Color & Pigmentation] : normal skin color and pigmentation [] : no rash [No Focal Deficits] : no focal deficits [Oriented To Time, Place, And Person] : oriented to person, place, and time

## 2021-07-22 NOTE — PROCEDURE
[FreeTextEntry1] : Area left pleurX cleansed with betadine, and using sterile technique 20cc lidocaine 1% without epi was used to anesthetize locally, cuff released with blunt dissection, pleurX removed without complication, occlusive dressing placed.  Patient comfortable.  Post procedure CXR shows no pneumothorax.

## 2021-07-22 NOTE — ASSESSMENT
[FreeTextEntry1] : 66 year old male s/p  Left VATS PleurX catheter insertion , left pleural biopsy.  Path negative for malignancy.  Path showed extensive granulomatous disease. Follow up with Dr. Anderson. \par Called daughter and using Interpret #[id number] let her know Mr. Pandya can restart his Plavix 7/22/21.\par \par RTO prn\par \par Written by  Mari Bowman PA-C acting as a scribe for Barrera Aden MD. The documentation recorded by the scribe accurately reflects the service I personally performed and the decisions made by me, BARRERA ADEN MD.\par \par

## 2021-07-22 NOTE — HISTORY OF PRESENT ILLNESS
[FreeTextEntry1] : He presents with daughter. They are Mandarin speaking and we communicated through the interpreters ID#[id number].  He denies pain, denies dyspnea, denies fever.  He's being drained 1x/week and daughter says there is very little drainage.  He is here for pleurX removal.  He confirms that he stopped his Plavix 5 days ago.

## 2021-07-22 NOTE — DATA REVIEWED
[FreeTextEntry1] : post pleurX removal CXR shows no pneumothorax, no significant  infiltrate or effusion.

## 2021-07-22 NOTE — REASON FOR VISIT
[Follow-Up: _____] : a [unfilled] follow-up visit [Family Member] : family member [de-identified] :  Left videoassisted thoracoscopic surgery.\par  Left pleural biopsy.\par  PleurX catheter insertion.\par  Flexible bronchoscopy with lavage [de-identified] : 6/28/21

## 2021-07-28 LAB
CULTURE RESULTS: SIGNIFICANT CHANGE UP
SPECIMEN SOURCE: SIGNIFICANT CHANGE UP

## 2021-08-02 ENCOUNTER — APPOINTMENT (OUTPATIENT)
Dept: PULMONOLOGY | Facility: CLINIC | Age: 66
End: 2021-08-02
Payer: MEDICAID

## 2021-08-02 ENCOUNTER — LABORATORY RESULT (OUTPATIENT)
Age: 66
End: 2021-08-02

## 2021-08-02 VITALS
HEIGHT: 67 IN | HEART RATE: 86 BPM | DIASTOLIC BLOOD PRESSURE: 91 MMHG | OXYGEN SATURATION: 98 % | SYSTOLIC BLOOD PRESSURE: 141 MMHG

## 2021-08-02 VITALS — BODY MASS INDEX: 23.49 KG/M2 | WEIGHT: 150 LBS

## 2021-08-02 VITALS — TEMPERATURE: 97.2 F

## 2021-08-02 PROCEDURE — 71046 X-RAY EXAM CHEST 2 VIEWS: CPT

## 2021-08-02 PROCEDURE — 99214 OFFICE O/P EST MOD 30 MIN: CPT | Mod: 25

## 2021-08-02 NOTE — ASSESSMENT
[FreeTextEntry1] : 66M with left pleural effusion.\par VATS pleural bx with extensive necrotizing granulomatous inflammation.\par Quant negative which can be with acute infection\par AFB and fungal stains negative but cultures pending\par Pt is from [place]. He had some weight loss and low grade fevers and sweats.\par No evidence of another alternate etiology, negative for malignancy.\par \par At this time, would treat for TB pleuritis with 4 drug regimen and reassess\par Tissue bx was not sent for culture. bronchial wash negative for AFB and no growth.\par Sputum FRAN x1 in broth.\par \par Pleurx removed at this time.\par Overall clinically improved. CXR appears improved as well.\par Rash is concerning, could be Pyrazinamide or Ethambutol.\par Will get serologies and stop these 2 meds and reassess in 1-2 weeks.\par Discussed at length risks and benefits of TB therapy, pt and daughter in agreement\par Ophtho referral\par

## 2021-08-02 NOTE — PROCEDURE
[FreeTextEntry1] : PFT:\par mild obstruction\par normal lung volumes\par normal diffusion\par \par CXR:\par left loculated effusion with air fluid level suggestive of hydropneumothorax and c/w trapped lung.

## 2021-08-02 NOTE — HISTORY OF PRESENT ILLNESS
[TextBox_4] : 66M here for evaluation of pleural effusion. He was in the hospital at [facility] and had  a large left pleural effusion and VATS with pleurx catheter placement. He is here for evaluation because his pathology revealed necrotizing granulomas. \par Since discharge his effusion has been drained twice and last 2 days ago there was no fluid to drain. Overall he feels less chest pain but persistent dry cough. 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 Quit 1 month ago. \par \par 8/2/21\par Pt here for follow up\par Reports multiple side effects due to medications. Rash over chest abdomen and shoulders, significant itching. Appears to be worse after taking ethambutol and pyrazinamide and he has been taking half the dose the last few days with improved side effects. Reports evening hot flashes, constipation, dry mouth and poor appetite due to bitter taste in mouth. No visual changes, no neuropathy, no abdominal pain.\par Dry cough, persistent, no phlegm.\par No night sweats, no sob, walks more, feels better overall.\par \par \par

## 2021-08-03 LAB
ACE BLD-CCNC: 40 U/L
ALBUMIN SERPL ELPH-MCNC: 4.1 G/DL
ALP BLD-CCNC: 183 U/L
ALT SERPL-CCNC: 87 U/L
ANION GAP SERPL CALC-SCNC: 13 MMOL/L
AST SERPL-CCNC: 41 U/L
BASOPHILS # BLD AUTO: 0.06 K/UL
BASOPHILS NFR BLD AUTO: 0.8 %
BILIRUB SERPL-MCNC: 0.2 MG/DL
BUN SERPL-MCNC: 16 MG/DL
CALCIUM SERPL-MCNC: 9.1 MG/DL
CHLORIDE SERPL-SCNC: 103 MMOL/L
CO2 SERPL-SCNC: 24 MMOL/L
CREAT SERPL-MCNC: 1.01 MG/DL
DEPRECATED KAPPA LC FREE/LAMBDA SER: 0.65 RATIO
EOSINOPHIL # BLD AUTO: 0.41 K/UL
EOSINOPHIL NFR BLD AUTO: 5.4 %
GLUCOSE SERPL-MCNC: 80 MG/DL
HCT VFR BLD CALC: 43.9 %
HGB BLD-MCNC: 13.8 G/DL
IGA SER QL IEP: 330 MG/DL
IGG SER QL IEP: 1624 MG/DL
IGM SER QL IEP: 68 MG/DL
IMM GRANULOCYTES NFR BLD AUTO: 0.4 %
KAPPA LC CSF-MCNC: 3.81 MG/DL
KAPPA LC SERPL-MCNC: 2.49 MG/DL
LYMPHOCYTES # BLD AUTO: 1.34 K/UL
LYMPHOCYTES NFR BLD AUTO: 17.5 %
MAN DIFF?: NORMAL
MCHC RBC-ENTMCNC: 29.4 PG
MCHC RBC-ENTMCNC: 31.4 GM/DL
MCV RBC AUTO: 93.6 FL
MONOCYTES # BLD AUTO: 1.05 K/UL
MONOCYTES NFR BLD AUTO: 13.7 %
NEUTROPHILS # BLD AUTO: 4.77 K/UL
NEUTROPHILS NFR BLD AUTO: 62.2 %
PLATELET # BLD AUTO: 411 K/UL
POTASSIUM SERPL-SCNC: 4.5 MMOL/L
PROT SERPL-MCNC: 7.6 G/DL
RBC # BLD: 4.69 M/UL
RBC # FLD: 14.9 %
SODIUM SERPL-SCNC: 140 MMOL/L
WBC # FLD AUTO: 7.66 K/UL

## 2021-08-04 LAB
ANA SER IF-ACNC: NEGATIVE
M TB IFN-G BLD-IMP: NEGATIVE
MPO AB + PR3 PNL SER: NORMAL
QUANTIFERON TB PLUS MITOGEN MINUS NIL: 0.56 IU/ML
QUANTIFERON TB PLUS NIL: 0.06 IU/ML
QUANTIFERON TB PLUS TB1 MINUS NIL: 0.1 IU/ML
QUANTIFERON TB PLUS TB2 MINUS NIL: 0.03 IU/ML

## 2021-08-09 ENCOUNTER — APPOINTMENT (OUTPATIENT)
Dept: CT IMAGING | Facility: HOSPITAL | Age: 66
End: 2021-08-09
Payer: MEDICAID

## 2021-08-09 ENCOUNTER — OUTPATIENT (OUTPATIENT)
Dept: OUTPATIENT SERVICES | Facility: HOSPITAL | Age: 66
LOS: 1 days | End: 2021-08-09
Payer: MEDICAID

## 2021-08-09 DIAGNOSIS — J90 PLEURAL EFFUSION, NOT ELSEWHERE CLASSIFIED: ICD-10-CM

## 2021-08-09 DIAGNOSIS — A15.6 TUBERCULOUS PLEURISY: ICD-10-CM

## 2021-08-09 PROCEDURE — 71250 CT THORAX DX C-: CPT | Mod: 26

## 2021-08-09 PROCEDURE — 71250 CT THORAX DX C-: CPT

## 2021-08-16 ENCOUNTER — APPOINTMENT (OUTPATIENT)
Dept: PULMONOLOGY | Facility: CLINIC | Age: 66
End: 2021-08-16
Payer: MEDICAID

## 2021-08-16 VITALS
SYSTOLIC BLOOD PRESSURE: 118 MMHG | TEMPERATURE: 97.1 F | OXYGEN SATURATION: 97 % | WEIGHT: 150 LBS | HEART RATE: 71 BPM | HEIGHT: 67 IN | DIASTOLIC BLOOD PRESSURE: 80 MMHG | BODY MASS INDEX: 23.54 KG/M2

## 2021-08-16 PROCEDURE — 99214 OFFICE O/P EST MOD 30 MIN: CPT

## 2021-08-16 NOTE — HISTORY OF PRESENT ILLNESS
[TextBox_4] : 66M here for evaluation of pleural effusion. He was in the hospital at [facility] and had  a large left pleural effusion and VATS with pleurx catheter placement. He is here for evaluation because his pathology revealed necrotizing granulomas. \par Since discharge his effusion has been drained twice and last 2 days ago there was no fluid to drain. Overall he feels less chest pain but persistent dry cough. 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 Quit 1 month ago. \par \par 8/2/21\par Pt here for follow up\par Reports multiple side effects due to medications. Rash over chest abdomen and shoulders, significant itching. Appears to be worse after taking ethambutol and pyrazinamide and he has been taking half the dose the last few days with improved side effects. Reports evening hot flashes, constipation, dry mouth and poor appetite due to bitter taste in mouth. No visual changes, no neuropathy, no abdominal pain.\par Dry cough, persistent, no phlegm.\par No night sweats, no sob, walks more, feels better overall.\par \par \par

## 2021-08-18 LAB
CULTURE RESULTS: SIGNIFICANT CHANGE UP
CULTURE RESULTS: SIGNIFICANT CHANGE UP
SPECIMEN SOURCE: SIGNIFICANT CHANGE UP
SPECIMEN SOURCE: SIGNIFICANT CHANGE UP

## 2021-08-24 ENCOUNTER — APPOINTMENT (OUTPATIENT)
Dept: PULMONOLOGY | Facility: CLINIC | Age: 66
End: 2021-08-24

## 2021-09-20 ENCOUNTER — APPOINTMENT (OUTPATIENT)
Dept: PULMONOLOGY | Facility: CLINIC | Age: 66
End: 2021-09-20
Payer: MEDICAID

## 2021-09-20 VITALS — TEMPERATURE: 97.7 F

## 2021-09-20 VITALS
DIASTOLIC BLOOD PRESSURE: 79 MMHG | BODY MASS INDEX: 23.54 KG/M2 | WEIGHT: 150 LBS | SYSTOLIC BLOOD PRESSURE: 122 MMHG | HEART RATE: 64 BPM | HEIGHT: 67 IN

## 2021-09-20 PROCEDURE — 71046 X-RAY EXAM CHEST 2 VIEWS: CPT

## 2021-09-20 PROCEDURE — 99214 OFFICE O/P EST MOD 30 MIN: CPT | Mod: 25

## 2021-09-20 RX ORDER — ETHAMBUTOL HYDROCHLORIDE 400 MG/1
400 TABLET ORAL
Qty: 90 | Refills: 1 | Status: DISCONTINUED | COMMUNITY
Start: 2021-07-13 | End: 2021-09-20

## 2021-09-20 RX ORDER — PYRAZINAMIDE 500 MG/1
500 TABLET ORAL DAILY
Qty: 90 | Refills: 1 | Status: DISCONTINUED | COMMUNITY
Start: 2021-07-13 | End: 2021-09-20

## 2021-09-21 LAB
ALBUMIN SERPL ELPH-MCNC: 4.3 G/DL
ALP BLD-CCNC: 106 U/L
ALT SERPL-CCNC: 36 U/L
ANION GAP SERPL CALC-SCNC: 12 MMOL/L
AST SERPL-CCNC: 28 U/L
BILIRUB SERPL-MCNC: 0.3 MG/DL
BUN SERPL-MCNC: 20 MG/DL
CALCIUM SERPL-MCNC: 9.3 MG/DL
CHLORIDE SERPL-SCNC: 102 MMOL/L
CO2 SERPL-SCNC: 24 MMOL/L
CREAT SERPL-MCNC: 1.02 MG/DL
GLUCOSE SERPL-MCNC: 97 MG/DL
POTASSIUM SERPL-SCNC: 4.5 MMOL/L
PROT SERPL-MCNC: 7.4 G/DL
SODIUM SERPL-SCNC: 137 MMOL/L

## 2021-09-24 NOTE — PHYSICAL EXAM
[No Acute Distress] : no acute distress [Normal Oropharynx] : normal oropharynx [Normal Appearance] : normal appearance [Supple] : supple [No JVD] : no jvd [Normal Rate/Rhythm] : normal rate/rhythm [Normal S1, S2] : normal s1, s2 [No Murmurs] : no murmurs [No Resp Distress] : no resp distress [No Acc Muscle Use] : no acc muscle use [Clear to Auscultation Bilaterally] : clear to auscultation bilaterally [Benign] : benign [Not Tender] : not tender [No Masses] : no masses [No Clubbing] : no clubbing [No Edema] : no edema [No Focal Deficits] : no focal deficits [Oriented x3] : oriented x3 [TextBox_68] : Minor Left Lower lobe rub

## 2021-09-24 NOTE — ASSESSMENT
[FreeTextEntry1] : 66 M with left pleural effusion.\par VATS pleural bx with extensive necrotizing granulomatous inflammation.\par S/p pleurX\par Quant negative x2 which can be with acute infection\par AFB and fungal stains negative but cultures pending\par Pt is from [place]. He had some weight loss and low grade fevers and sweats.\par No evidence of another alternate etiology, negative for malignancy.\par \par CXR and clinical response to empiric TB pleuritis is very good  \par Tissue bx was not sent for culture. bronchial wash negative for AFB and no growth.\par Sputum FRAN x1 in broth.\par Had generalized skin rash concerning for Pyrazinamide/ Ethambutol - Meds stopped, now resolved\par Loss of visual acuity - likely 2/2 to Ethambutol as well \par Pt on follow up with ophtho : Reports upcoming cataract Sx \par Serologies normal\par \par C/w INH/Rifampin and monitor CMP for transaminitis

## 2021-09-24 NOTE — HISTORY OF PRESENT ILLNESS
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 [facility], and underwent VATS with pleurx catheter placement. Pathology revealed necrotizing granulomas, but pleural fluid unrevealing. Since discharge his effusion has been drained x 2. He was unable to tolerate full RIPE regimen and was transitioned to only INH and Rifampin after 2 months. 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 Quit 1 month ago. \par Some OSBORN for many years, can climb stairs, walk many blocks.\par \par 8/2/21\par Pt here for follow up\par Reports multiple side effects due to medications. Rash over chest abdomen and shoulders, significant itching. Appears to be worse after taking ethambutol and pyrazinamide and he has been taking half the dose the last few days with improved side effects. Reports evening hot flashes, constipation, dry mouth and poor appetite due to bitter taste in mouth. No visual changes, no neuropathy, no abdominal pain.\par Dry cough, persistent, no phlegm.\par No night sweats, no sob, walks more, feels better overall.\par \par 9/20/21\par Pt presents for further follow up while on maintenance TB therapy. Endorses minor left sided pleuritic chest pain, stable non productive cough, some peripheral neuropathy and loss of taste from medicines. He denies weight loss, abdominal pain, jaundice, N/V/D, or worsening visual symptoms. He has follow up with an Ophthalmologist and scheduled for cataract sx in the near future. Otherwise tolerating medication well. CXR showing improvement.

## 2021-10-11 ENCOUNTER — APPOINTMENT (OUTPATIENT)
Dept: PULMONOLOGY | Facility: CLINIC | Age: 66
End: 2021-10-11

## 2021-11-02 ENCOUNTER — APPOINTMENT (OUTPATIENT)
Dept: PULMONOLOGY | Facility: CLINIC | Age: 66
End: 2021-11-02
Payer: MEDICAID

## 2021-11-02 VITALS — TEMPERATURE: 98.1 F

## 2021-11-02 VITALS
WEIGHT: 151 LBS | SYSTOLIC BLOOD PRESSURE: 153 MMHG | OXYGEN SATURATION: 99 % | HEART RATE: 85 BPM | BODY MASS INDEX: 23.7 KG/M2 | HEIGHT: 67 IN | DIASTOLIC BLOOD PRESSURE: 82 MMHG

## 2021-11-02 PROCEDURE — 99214 OFFICE O/P EST MOD 30 MIN: CPT

## 2021-11-04 LAB
ALBUMIN SERPL ELPH-MCNC: 4.6 G/DL
ALP BLD-CCNC: 92 U/L
ALT SERPL-CCNC: 26 U/L
ANION GAP SERPL CALC-SCNC: 15 MMOL/L
AST SERPL-CCNC: 27 U/L
BASOPHILS # BLD AUTO: 0.04 K/UL
BASOPHILS NFR BLD AUTO: 0.7 %
BILIRUB SERPL-MCNC: 0.3 MG/DL
BUN SERPL-MCNC: 19 MG/DL
CALCIUM SERPL-MCNC: 9.5 MG/DL
CHLORIDE SERPL-SCNC: 104 MMOL/L
CO2 SERPL-SCNC: 22 MMOL/L
CREAT SERPL-MCNC: 0.89 MG/DL
EOSINOPHIL # BLD AUTO: 0.19 K/UL
EOSINOPHIL NFR BLD AUTO: 3.4 %
GLUCOSE SERPL-MCNC: 105 MG/DL
HCT VFR BLD CALC: 44.5 %
HGB BLD-MCNC: 14.6 G/DL
IMM GRANULOCYTES NFR BLD AUTO: 0.2 %
LYMPHOCYTES # BLD AUTO: 1.29 K/UL
LYMPHOCYTES NFR BLD AUTO: 23 %
MAN DIFF?: NORMAL
MCHC RBC-ENTMCNC: 30.9 PG
MCHC RBC-ENTMCNC: 32.8 GM/DL
MCV RBC AUTO: 94.1 FL
MONOCYTES # BLD AUTO: 0.72 K/UL
MONOCYTES NFR BLD AUTO: 12.8 %
NEUTROPHILS # BLD AUTO: 3.37 K/UL
NEUTROPHILS NFR BLD AUTO: 59.9 %
PLATELET # BLD AUTO: 268 K/UL
POTASSIUM SERPL-SCNC: 4.5 MMOL/L
PROT SERPL-MCNC: 7.5 G/DL
RBC # BLD: 4.73 M/UL
RBC # FLD: 15 %
SODIUM SERPL-SCNC: 141 MMOL/L
WBC # FLD AUTO: 5.62 K/UL

## 2021-11-04 NOTE — ASSESSMENT
[FreeTextEntry1] : 66 M with left pleural effusion.\par VATS pleural bx with extensive necrotizing granulomatous inflammation.\par S/p pleurX\par Quant negative x2 which can be with acute infection\par AFB and fungal stains negative, cultures not sent\par FRAN x1 positive\par Pt is from [place]. He had some weight loss and low grade fevers and sweats.\par No evidence of another alternate etiology, negative for malignancy.\par \par CXR and clinical response to empiric TB pleuritis tx is very good  \par Had generalized skin rash concerning for Pyrazinamide/ Ethambutol - Meds stopped, now resolved\par Loss of visual acuity - likely 2/2 to Ethambutol as well  - now resolved\par numbness of fingers - ?INH, mild, cont Vit B6 at higher dose 50mg\par \par Serologies normal\par \par C/w INH/Rifampin/Vit B6 \par s/p 4months therapy\par f/u in 2 months or sooner if new symptoms occur\par Plan to reimage at that time

## 2021-11-04 NOTE — HISTORY OF PRESENT ILLNESS
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 He was initially diagnosed after presenting to [facility] with a large left pleural effusion, and underwent VATS with pleurx catheter placement. Pathology revealed necrotizing granulomas, but pleural fluid unrevealing. Since discharge his effusion has been drained x 2. He was unable to tolerate full RIPE regimen and was transitioned to only INH and Rifampin after 2 months. 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 Quit 1 month ago. \par Some OSBORN for many years, can climb stairs, walk many blocks.\par \par 8/2/21\par Pt here for follow up\par Reports multiple side effects due to medications. Rash over chest abdomen and shoulders, significant itching. Appears to be worse after taking ethambutol and pyrazinamide and he has been taking half the dose the last few days with improved side effects. Reports evening hot flashes, constipation, dry mouth and poor appetite due to bitter taste in mouth. No visual changes, no neuropathy, no abdominal pain.\par Dry cough, persistent, no phlegm.\par No night sweats, no sob, walks more, feels better overall.\par \par 9/20/21\par Pt presents for further follow up while on maintenance TB therapy. Endorses minor left sided pleuritic chest pain, stable non productive cough, some peripheral neuropathy and loss of taste from medicines. He denies weight loss, abdominal pain, jaundice, N/V/D, or worsening visual symptoms. He has follow up with an Ophthalmologist and scheduled for cataract sx in the near future. Otherwise tolerating medication well. CXR showing improvement. \par \par 11/2/21\par Doing well, tolerating meds.\par reports some itching of the palms of his hands and numbness in fingers for past 1 month.\par Vision issues have resolved and back to normal\par No cough, left chest discomfort improved.\par

## 2022-01-03 ENCOUNTER — APPOINTMENT (OUTPATIENT)
Dept: PULMONOLOGY | Facility: CLINIC | Age: 67
End: 2022-01-03
Payer: MEDICAID

## 2022-01-03 VITALS
SYSTOLIC BLOOD PRESSURE: 146 MMHG | HEART RATE: 62 BPM | OXYGEN SATURATION: 97 % | DIASTOLIC BLOOD PRESSURE: 88 MMHG | WEIGHT: 151 LBS | HEIGHT: 67 IN | BODY MASS INDEX: 23.7 KG/M2

## 2022-01-03 VITALS — TEMPERATURE: 96.7 F

## 2022-01-03 PROCEDURE — 99214 OFFICE O/P EST MOD 30 MIN: CPT

## 2022-01-11 ENCOUNTER — APPOINTMENT (OUTPATIENT)
Dept: CT IMAGING | Facility: HOSPITAL | Age: 67
End: 2022-01-11
Payer: MEDICAID

## 2022-01-11 ENCOUNTER — OUTPATIENT (OUTPATIENT)
Dept: OUTPATIENT SERVICES | Facility: HOSPITAL | Age: 67
LOS: 1 days | End: 2022-01-11
Payer: MEDICAID

## 2022-01-11 DIAGNOSIS — A15.6 TUBERCULOUS PLEURISY: ICD-10-CM

## 2022-01-11 DIAGNOSIS — J90 PLEURAL EFFUSION, NOT ELSEWHERE CLASSIFIED: ICD-10-CM

## 2022-01-11 PROCEDURE — 71250 CT THORAX DX C-: CPT | Mod: 26

## 2022-01-11 PROCEDURE — 71250 CT THORAX DX C-: CPT

## 2022-01-13 NOTE — ASSESSMENT
[FreeTextEntry1] : 66 M with left pleural effusion.\par VATS pleural bx with extensive necrotizing granulomatous inflammation.\par S/p pleurX\par Quant negative x2 which can be with acute infection\par AFB and fungal stains negative, cultures not sent\par FRAN x1 positive\par Pt is from [place]. He had some weight loss and low grade fevers and sweats.\par No evidence of another alternate etiology, negative for malignancy.\par \par CXR and clinical response to empiric TB pleuritis tx is very good  \par Had generalized skin rash concerning for Pyrazinamide/ Ethambutol - Meds stopped, now resolved\par Loss of visual acuity - likely 2/2 to Ethambutol as well  - now resolved\par numbness of fingers - ?INH, mild, cont Vit B6 at higher dose 50mg\par \par Serologies normal\par completed 6 mo of 2 drug therapy\par Plan to reimage at that time

## 2022-01-13 NOTE — HISTORY OF PRESENT ILLNESS
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 He was initially diagnosed after presenting to [facility] with a large left pleural effusion, and underwent VATS with pleurx catheter placement. Pathology revealed necrotizing granulomas, but pleural fluid unrevealing. Since discharge his effusion has been drained x 2. He was unable to tolerate full RIPE regimen and was transitioned to only INH and Rifampin after 2 months. 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 Quit 1 month ago. \par Some OSBORN for many years, can climb stairs, walk many blocks.\par \par 8/2/21\par Pt here for follow up\par Reports multiple side effects due to medications. Rash over chest abdomen and shoulders, significant itching. Appears to be worse after taking ethambutol and pyrazinamide and he has been taking half the dose the last few days with improved side effects. Reports evening hot flashes, constipation, dry mouth and poor appetite due to bitter taste in mouth. No visual changes, no neuropathy, no abdominal pain.\par Dry cough, persistent, no phlegm.\par No night sweats, no sob, walks more, feels better overall.\par \par 9/20/21\par Pt presents for further follow up while on maintenance TB therapy. Endorses minor left sided pleuritic chest pain, stable non productive cough, some peripheral neuropathy and loss of taste from medicines. He denies weight loss, abdominal pain, jaundice, N/V/D, or worsening visual symptoms. He has follow up with an Ophthalmologist and scheduled for cataract sx in the near future. Otherwise tolerating medication well. CXR showing improvement. \par \par 11/2/21\par Doing well, tolerating meds.\par reports some itching of the palms of his hands and numbness in fingers for past 1 month.\par Vision issues have resolved and back to normal\par No cough, left chest discomfort improved.\par

## 2022-01-24 ENCOUNTER — APPOINTMENT (OUTPATIENT)
Dept: PULMONOLOGY | Facility: CLINIC | Age: 67
End: 2022-01-24
Payer: MEDICAID

## 2022-01-24 VITALS
SYSTOLIC BLOOD PRESSURE: 131 MMHG | WEIGHT: 151 LBS | DIASTOLIC BLOOD PRESSURE: 85 MMHG | HEIGHT: 67 IN | HEART RATE: 57 BPM | BODY MASS INDEX: 23.7 KG/M2 | OXYGEN SATURATION: 98 %

## 2022-01-24 VITALS — TEMPERATURE: 97.1 F

## 2022-01-24 PROCEDURE — 99214 OFFICE O/P EST MOD 30 MIN: CPT

## 2022-01-31 NOTE — HISTORY OF PRESENT ILLNESS
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 He was initially diagnosed after presenting to [facility] with a large left pleural effusion, and underwent VATS with pleurx catheter placement. Pathology revealed necrotizing granulomas, but pleural fluid unrevealing. Since discharge his effusion has been drained x 2. He was unable to tolerate full RIPE regimen and was transitioned to only INH and Rifampin after 2 months. 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 Quit 1 month ago. \par Some OSBORN for many years, can climb stairs, walk many blocks.\par \par 8/2/21\par Pt here for follow up\par Reports multiple side effects due to medications. Rash over chest abdomen and shoulders, significant itching. Appears to be worse after taking ethambutol and pyrazinamide and he has been taking half the dose the last few days with improved side effects. Reports evening hot flashes, constipation, dry mouth and poor appetite due to bitter taste in mouth. No visual changes, no neuropathy, no abdominal pain.\par Dry cough, persistent, no phlegm.\par No night sweats, no sob, walks more, feels better overall.\par \par 9/20/21\par Pt presents for further follow up while on maintenance TB therapy. Endorses minor left sided pleuritic chest pain, stable non productive cough, some peripheral neuropathy and loss of taste from medicines. He denies weight loss, abdominal pain, jaundice, N/V/D, or worsening visual symptoms. He has follow up with an Ophthalmologist and scheduled for cataract sx in the near future. Otherwise tolerating medication well. CXR showing improvement. \par \par 11/2/21\par Doing well, tolerating meds.\par reports some itching of the palms of his hands and numbness in fingers for past 1 month.\par Vision issues have resolved and back to normal\par No cough, left chest discomfort improved.\par

## 2022-01-31 NOTE — ASSESSMENT
[FreeTextEntry1] : 66 M with left pleural effusion.\par VATS pleural bx with extensive necrotizing granulomatous inflammation.\par S/p pleurX\par Quant negative x2 which can be with acute infection\par AFB and fungal stains negative, cultures not sent\par FRAN x1 positive\par Pt is from [place]. He had some weight loss and low grade fevers and sweats.\par No evidence of another alternate etiology, negative for malignancy.\par \par CXR and clinical response to empiric TB pleuritis tx is very good  \par Had generalized skin rash concerning for Pyrazinamide/ Ethambutol - Meds stopped, now resolved\par Loss of visual acuity - likely 2/2 to Ethambutol as well  - now resolved\par numbness of fingers - ?INH, mild, cont Vit B6 at higher dose 50mg\par \par Serologies normal\par completed 6 mo of 2 drug therapy\par Reimaging reviewed, improvement in Left effusion and left pleural thickening\par Plan to repeat in 6 mo to f/u on adenopathy AND PLEURAL THICKENING\par

## 2022-06-28 ENCOUNTER — APPOINTMENT (OUTPATIENT)
Dept: PULMONOLOGY | Facility: CLINIC | Age: 67
End: 2022-06-28
Payer: MEDICARE

## 2022-06-28 VITALS
HEART RATE: 65 BPM | WEIGHT: 151 LBS | DIASTOLIC BLOOD PRESSURE: 93 MMHG | SYSTOLIC BLOOD PRESSURE: 140 MMHG | HEIGHT: 67 IN | OXYGEN SATURATION: 96 % | BODY MASS INDEX: 23.7 KG/M2

## 2022-06-28 DIAGNOSIS — R59.0 LOCALIZED ENLARGED LYMPH NODES: ICD-10-CM

## 2022-06-28 DIAGNOSIS — J90 PLEURAL EFFUSION, NOT ELSEWHERE CLASSIFIED: ICD-10-CM

## 2022-06-28 PROCEDURE — 99214 OFFICE O/P EST MOD 30 MIN: CPT

## 2022-06-28 NOTE — HISTORY OF PRESENT ILLNESS
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 He was initially diagnosed after presenting to [facility] with a large left pleural effusion, and underwent VATS with pleurx catheter placement. Pathology revealed necrotizing granulomas, but pleural fluid unrevealing. Since discharge his effusion has been drained x 2. He was unable to tolerate full RIPE regimen and was transitioned to only INH and Rifampin after 2 months. 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 Quit 1 month ago. \par Some OSBORN for many years, can climb stairs, walk many blocks.\par \par 8/2/21\par Pt here for follow up\par Reports multiple side effects due to medications. Rash over chest abdomen and shoulders, significant itching. Appears to be worse after taking ethambutol and pyrazinamide and he has been taking half the dose the last few days with improved side effects. Reports evening hot flashes, constipation, dry mouth and poor appetite due to bitter taste in mouth. No visual changes, no neuropathy, no abdominal pain.\par Dry cough, persistent, no phlegm.\par No night sweats, no sob, walks more, feels better overall.\par \par 9/20/21\par Pt presents for further follow up while on maintenance TB therapy. Endorses minor left sided pleuritic chest pain, stable non productive cough, some peripheral neuropathy and loss of taste from medicines. He denies weight loss, abdominal pain, jaundice, N/V/D, or worsening visual symptoms. He has follow up with an Ophthalmologist and scheduled for cataract sx in the near future. Otherwise tolerating medication well. CXR showing improvement. \par \par 11/2/21\par Doing well, tolerating meds.\par reports some itching of the palms of his hands and numbness in fingers for past 1 month.\par Vision issues have resolved and back to normal\par No cough, left chest discomfort improved.\par \par 6/28/2022\par Here for f/u 6mo after completion of therapy.\par Doing well, OSBORN at baseline\par Reports cough with some thick phlegm, bia when laying down\par Reports fleeting chest and upper back pains, intermittent, atypical, nonexertional

## 2022-06-28 NOTE — ASSESSMENT
[FreeTextEntry1] : 66 M with left pleural effusion.\par VATS pleural bx with extensive necrotizing granulomatous inflammation.\par S/p pleurX\par Quant negative x2 which can be with acute infection\par AFB and fungal stains negative, cultures not sent\par FRAN x1 positive\par Pt is from [place]. He had some weight loss and low grade fevers and sweats.\par No evidence of another alternate etiology, negative for malignancy.\par \par CXR and clinical response to empiric TB pleuritis tx is very good  \par Had generalized skin rash concerning for Pyrazinamide/ Ethambutol - Meds stopped, now resolved\par Loss of visual acuity - likely 2/2 to Ethambutol as well  - now resolved\par numbness of fingers - ?INH, mild, cont Vit B6 at higher dose 50mg\par \par Serologies normal\par completed 6 mo of 2 drug therapy\par Reimaging reviewed, improvement in Left effusion and left pleural thickening\par Plan to repeat in 6 mo to f/u on adenopathy AND PLEURAL THICKENING\par \par Referred for repeat CT\par

## 2022-07-18 ENCOUNTER — OUTPATIENT (OUTPATIENT)
Dept: OUTPATIENT SERVICES | Facility: HOSPITAL | Age: 67
LOS: 1 days | End: 2022-07-18
Payer: COMMERCIAL

## 2022-07-18 ENCOUNTER — APPOINTMENT (OUTPATIENT)
Dept: CT IMAGING | Facility: HOSPITAL | Age: 67
End: 2022-07-18

## 2022-07-18 DIAGNOSIS — J90 PLEURAL EFFUSION, NOT ELSEWHERE CLASSIFIED: ICD-10-CM

## 2022-07-18 DIAGNOSIS — R59.0 LOCALIZED ENLARGED LYMPH NODES: ICD-10-CM

## 2022-07-18 PROCEDURE — 71250 CT THORAX DX C-: CPT | Mod: 26

## 2022-07-18 PROCEDURE — 71250 CT THORAX DX C-: CPT

## 2022-08-16 ENCOUNTER — APPOINTMENT (OUTPATIENT)
Dept: PULMONOLOGY | Facility: CLINIC | Age: 67
End: 2022-08-16

## 2022-08-16 VITALS
OXYGEN SATURATION: 99 % | WEIGHT: 179 LBS | DIASTOLIC BLOOD PRESSURE: 99 MMHG | HEART RATE: 63 BPM | BODY MASS INDEX: 28.09 KG/M2 | HEIGHT: 67 IN | SYSTOLIC BLOOD PRESSURE: 165 MMHG

## 2022-08-16 VITALS — TEMPERATURE: 97.2 F

## 2022-08-16 PROCEDURE — 99214 OFFICE O/P EST MOD 30 MIN: CPT

## 2022-08-16 RX ORDER — OMEPRAZOLE 40 MG/1
40 CAPSULE, DELAYED RELEASE ORAL
Qty: 30 | Refills: 6 | Status: ACTIVE | COMMUNITY
Start: 2021-08-02 | End: 1900-01-01

## 2022-08-17 NOTE — HISTORY OF PRESENT ILLNESS
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 He was initially diagnosed after presenting to [facility] with a large left pleural effusion, and underwent VATS with pleurx catheter placement. Pathology revealed necrotizing granulomas, but pleural fluid unrevealing. Since discharge his effusion has been drained x 2. He was unable to tolerate full RIPE regimen and was transitioned to only INH and Rifampin after 2 months. 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 Quit 1 month ago. \par Some OSBORN for many years, can climb stairs, walk many blocks.\par \par 8/2/21\par Pt here for follow up\par Reports multiple side effects due to medications. Rash over chest abdomen and shoulders, significant itching. Appears to be worse after taking ethambutol and pyrazinamide and he has been taking half the dose the last few days with improved side effects. Reports evening hot flashes, constipation, dry mouth and poor appetite due to bitter taste in mouth. No visual changes, no neuropathy, no abdominal pain.\par Dry cough, persistent, no phlegm.\par No night sweats, no sob, walks more, feels better overall.\par \par 9/20/21\par Pt presents for further follow up while on maintenance TB therapy. Endorses minor left sided pleuritic chest pain, stable non productive cough, some peripheral neuropathy and loss of taste from medicines. He denies weight loss, abdominal pain, jaundice, N/V/D, or worsening visual symptoms. He has follow up with an Ophthalmologist and scheduled for cataract sx in the near future. Otherwise tolerating medication well. CXR showing improvement. \par \par 11/2/21\par Doing well, tolerating meds.\par reports some itching of the palms of his hands and numbness in fingers for past 1 month.\par Vision issues have resolved and back to normal\par No cough, left chest discomfort improved.\par \par 6/28/2022\par Here for f/u 6mo after completion of therapy.\par Doing well, OSBORN at baseline\par Reports cough with some thick phlegm, bia when laying down\par Reports fleeting chest and upper back pains, intermittent, atypical, nonexertional\par \par 8/16/2022\par Pt reports chronic dry cough\par minimal phlegm\par atypical cp as above\par his ET has improved overall

## 2022-08-17 NOTE — ASSESSMENT
[FreeTextEntry1] : 66 M with left pleural effusion.\par VATS pleural bx with extensive necrotizing granulomatous inflammation.\par S/p pleurX\par Quant negative x2 which can be with acute infection\par AFB and fungal stains negative, cultures not sent\par FRAN x1 positive\par Pt is from [place]. He had some weight loss and low grade fevers and sweats.\par No evidence of another alternate etiology, negative for malignancy.\par \par CXR and clinical response to empiric TB pleuritis tx is very good  \par Had generalized skin rash concerning for Pyrazinamide/ Ethambutol - Meds stopped, now resolved\par Loss of visual acuity - likely 2/2 to Ethambutol as well  - now resolved\par numbness of fingers - ?INH, mild, cont Vit B6 at higher dose 50mg\par \par Serologies normal\par completed 6 mo of 2 drug therapy\par Reimaging reviewed, improvement in  adenopathy AND PLEURAL THICKENING, decreased pulmonary nodule\par \par Plan:\par trial of PPI daily\par RTC in 2 months\par \par

## 2022-09-19 ENCOUNTER — APPOINTMENT (OUTPATIENT)
Dept: PULMONOLOGY | Facility: CLINIC | Age: 67
End: 2022-09-19

## 2022-09-19 VITALS
SYSTOLIC BLOOD PRESSURE: 132 MMHG | WEIGHT: 179 LBS | HEART RATE: 66 BPM | HEIGHT: 67 IN | OXYGEN SATURATION: 98 % | DIASTOLIC BLOOD PRESSURE: 85 MMHG | BODY MASS INDEX: 28.09 KG/M2

## 2022-09-19 DIAGNOSIS — A15.6 TUBERCULOUS PLEURISY: ICD-10-CM

## 2022-09-19 PROCEDURE — 99214 OFFICE O/P EST MOD 30 MIN: CPT

## 2022-09-23 PROBLEM — A15.6: Status: ACTIVE | Noted: 2021-07-13

## 2022-09-23 NOTE — HISTORY OF PRESENT ILLNESS
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 He was initially diagnosed after presenting to [facility] with a large left pleural effusion, and underwent VATS with pleurx catheter placement. Pathology revealed necrotizing granulomas, but pleural fluid unrevealing. Since discharge his effusion has been drained x 2. He was unable to tolerate full RIPE regimen and was transitioned to only INH and Rifampin after 2 months. 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 Quit 1 month ago. \par Some OSBORN for many years, can climb stairs, walk many blocks.\par \par 8/2/21\par Pt here for follow up\par Reports multiple side effects due to medications. Rash over chest abdomen and shoulders, significant itching. Appears to be worse after taking ethambutol and pyrazinamide and he has been taking half the dose the last few days with improved side effects. Reports evening hot flashes, constipation, dry mouth and poor appetite due to bitter taste in mouth. No visual changes, no neuropathy, no abdominal pain.\par Dry cough, persistent, no phlegm.\par No night sweats, no sob, walks more, feels better overall.\par \par 9/20/21\par Pt presents for further follow up while on maintenance TB therapy. Endorses minor left sided pleuritic chest pain, stable non productive cough, some peripheral neuropathy and loss of taste from medicines. He denies weight loss, abdominal pain, jaundice, N/V/D, or worsening visual symptoms. He has follow up with an Ophthalmologist and scheduled for cataract sx in the near future. Otherwise tolerating medication well. CXR showing improvement. \par \par 11/2/21\par Doing well, tolerating meds.\par reports some itching of the palms of his hands and numbness in fingers for past 1 month.\par Vision issues have resolved and back to normal\par No cough, left chest discomfort improved.\par \par 6/28/2022\par Here for f/u 6mo after completion of therapy.\par Doing well, OSBORN at baseline\par Reports cough with some thick phlegm, bia when laying down\par Reports fleeting chest and upper back pains, intermittent, atypical, nonexertional\par \par 8/16/2022\par Pt reports chronic dry cough\par minimal phlegm\par atypical cp as above\par his ET has improved overall

## 2022-09-23 NOTE — ASSESSMENT
[FreeTextEntry1] : 66 M with left pleural effusion.\par VATS pleural bx with extensive necrotizing granulomatous inflammation.\par S/p pleurX\par Quant negative x2 which can be with acute infection\par AFB and fungal stains negative, cultures not sent\par FRAN x1 positive\par Pt is from [place]. He had some weight loss and low grade fevers and sweats.\par No evidence of another alternate etiology, negative for malignancy.\par \par CXR and clinical response to empiric TB pleuritis tx is very good  \par Had generalized skin rash concerning for Pyrazinamide/ Ethambutol - Meds stopped, now resolved\par Loss of visual acuity - likely 2/2 to Ethambutol as well  - now resolved\par numbness of fingers - ?INH, mild, cont Vit B6 at higher dose 50mg\par \par Serologies normal\par completed 6 mo of 2 drug therapy\par Reimaging reviewed, improvement in  adenopathy AND PLEURAL THICKENING, decreased pulmonary nodule\par \par Plan:\par cough improved on ppi\par recommend f/u with PMD\par RTC prn\par \par

## 2023-05-16 ENCOUNTER — APPOINTMENT (OUTPATIENT)
Dept: PULMONOLOGY | Facility: CLINIC | Age: 68
End: 2023-05-16
Payer: MEDICARE

## 2023-05-16 VITALS
WEIGHT: 174 LBS | HEIGHT: 67 IN | HEART RATE: 65 BPM | OXYGEN SATURATION: 98 % | DIASTOLIC BLOOD PRESSURE: 97 MMHG | BODY MASS INDEX: 27.31 KG/M2 | TEMPERATURE: 96.5 F | SYSTOLIC BLOOD PRESSURE: 156 MMHG

## 2023-05-16 PROCEDURE — 99214 OFFICE O/P EST MOD 30 MIN: CPT

## 2023-05-23 NOTE — REVIEW OF SYSTEMS
[Fatigue] : fatigue [Cough] : cough [Chest Tightness] : chest tightness [Sputum] : sputum [Dyspnea] : dyspnea [Pleuritic Pain] : pleuritic pain [SOB on Exertion] : sob on exertion [Nasal Congestion] : nasal congestion [Fever] : no fever [Recent Wt Gain (___ Lbs)] : ~T no recent weight gain [Chills] : no chills [Poor Appetite] : no poor appetite [Recent Wt Loss (___ Lbs)] : ~T no recent weight loss [Dry Eyes] : no dry eyes [Ear Disturbance] : no ear disturbance [Epistaxis] : no epistaxis [Eye Irritation] : no eye irritation [Postnasal Drip] : no postnasal drip [Sinus Problems] : no sinus problems [Mouth Ulcers] : no mouth ulcers [Poor Dentition] : no poor dentition [Hemoptysis] : no hemoptysis [Frequent URIs] : no frequent URIs [Wheezing] : no wheezing [A.M. Dry Mouth] : no a.m. dry mouth [Chest Discomfort] : no chest discomfort [Claudication] : no claudication [Edema] : no edema [Leg Cramps] : no leg cramps [Orthopnea] : no orthopnea [Palpitations] : no palpitations [Phlebitis] : no phlebitis [PND] : no PND [Syncope] : no syncope [Hay Fever] : no hay fever [Watery Eyes] : no watery eyes [Itchy Eyes] : no itchy eyes [Seasonal Allergies] : no seasonal allergies [Nasal Discharge] : no nasal discharge [Hives] : no hives [Angioedema] : no angioedema [Immunocompromised] : not immunocompromised [GERD] : no gerd [Abdominal Pain] : no abdominal pain [Nausea] : no nausea [Vomiting] : no vomiting [Diarrhea] : no diarrhea [Constipation] : no constipation [Dysphagia] : no dysphagia [Bleeding] : no bleeding [Food Intolerance] : no food intolerance [Hepatic Disease] : no hepatic disease [Nocturia] : no nocturia [Frequency] : no dysuria [Urgency] : no frequency [Dysuria] : no urgency [Arthralgias] : no arthralgias [Myalgias] : no myalgias [Back Pain] : no back pain [Fracture] : no fracture [Trauma/ Injury] : no trauma/ injury [Chronic Pain] : no chronic pain [Rash] : no rash [Ulcerations] : no ulcerations [Itch] : no itch [Anemia] : no anemia [Blood Transfusion] : no blood transfusion [Easy Bruising] : no easy bruising [History of Iron Deficiency] : no history of iron deficiency [Clotting Disorder/ Frequent bleeding] : no clotting disorder/ frequent bleeding [Headache] : no headache [Focal Weakness] : no focal weakness [Seizures] : no seizures [Head Injury] : no head injury [Dizziness] : no dizziness [Numbness] : no numbness [Memory Loss] : no memory loss [Tremor] : no tremor [Paralysis] : no paralysis [Confusion] : no confusion [Involuntary Movements] : no involuntary movements [Depression] : no depression [Anxiety] : no anxiety [Panic Attacks] : no panic attacks [Diabetes] : no diabetes [Thyroid Problem] : no thyroid problem [Obesity] : no obesity

## 2023-05-23 NOTE — ASSESSMENT
[FreeTextEntry1] : 66 M with left pleural effusion.\par VATS pleural bx with extensive necrotizing granulomatous inflammation.\par S/p pleurX\par Quant negative x2 which can be with acute infection\par AFB and fungal stains negative, cultures not sent\par FRAN x1 positive\par Pt is from [place]. He had some weight loss and low grade fevers and sweats.\par No evidence of another alternate etiology, negative for malignancy.\par \par CXR and clinical response to empiric TB pleuritis tx is very good \par Had generalized skin rash concerning for Pyrazinamide/ Ethambutol - Meds stopped, now resolved\par Loss of visual acuity - likely 2/2 to Ethambutol as well - now resolved\par numbness of fingers - ?INH, mild, cont Vit B6 at higher dose 50mg - meds stopped\par \par Serologies normal\par completed 6 mo of 2 drug therapy\par Reimaging reviewed, improvement in adenopathy AND PLEURAL THICKENING, decreased pulmonary nodule\par \par \par Plan:\par Suspect seasonal allergies exacerbating chronic cough - would do trial of otc antihistamine qd\par c/w ppi for GERD\par May have chronic bronchitis\par CT chest Images reviewed, unchanged, awaiting official report\par Obtain PFTs at next visit

## 2023-05-23 NOTE — HISTORY OF PRESENT ILLNESS
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 He was initially diagnosed after presenting to [facility] with a large left pleural effusion, and underwent VATS with pleurx catheter placement. Pathology revealed necrotizing granulomas, but pleural fluid unrevealing. Since discharge his effusion has been drained x 2. He was unable to tolerate full RIPE regimen and was transitioned to only INH and Rifampin after 2 months. 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 Quit 1 month ago. \par Some OSBORN for many years, can climb stairs, walk many blocks.\par \par 8/2/21\par Pt here for follow up\par Reports multiple side effects due to medications. Rash over chest abdomen and shoulders, significant itching. Appears to be worse after taking ethambutol and pyrazinamide and he has been taking half the dose the last few days with improved side effects. Reports evening hot flashes, constipation, dry mouth and poor appetite due to bitter taste in mouth. No visual changes, no neuropathy, no abdominal pain.\par Dry cough, persistent, no phlegm.\par No night sweats, no sob, walks more, feels better overall.\par \par 9/20/21\par Pt presents for further follow up while on maintenance TB therapy. Endorses minor left sided pleuritic chest pain, stable non productive cough, some peripheral neuropathy and loss of taste from medicines. He denies weight loss, abdominal pain, jaundice, N/V/D, or worsening visual symptoms. He has follow up with an Ophthalmologist and scheduled for cataract sx in the near future. Otherwise tolerating medication well. CXR showing improvement. \par \par 11/2/21\par Doing well, tolerating meds.\par reports some itching of the palms of his hands and numbness in fingers for past 1 month.\par Vision issues have resolved and back to normal\par No cough, left chest discomfort improved.\par \par 6/28/2022\par Here for f/u 6mo after completion of therapy.\par Doing well, OSBORN at baseline\par Reports cough with some thick phlegm, bia when laying down\par Reports fleeting chest and upper back pains, intermittent, atypical, nonexertional\par \par 8/16/2022\par Pt reports chronic dry cough\par minimal phlegm\par atypical cp as above\par his ET has improved overall. \par \par 5/16/2023\par Pt returning for worsening dry cough with L scapular area pain, and L sided pleuritic chest pain with occassional white/yellow phlegm about 1month prior. Chest pain unrelated to exertion. He saw his pcp (Dr. Ariela Pandya) did an xray with L pleural effusion then had CT chest done. Cough increased since last visit. Pt with sinus congestion, red eyes.

## 2023-05-23 NOTE — END OF VISIT
[] : Resident [FreeTextEntry3] : Agree with above\par CT imaging personaLLY REVIEWED - NO PLEURAL EFFUSION, LEFT BASE PLEUROPARENCHYMAL SCARRING UNCHANGED. [Time Spent: ___ minutes] : I have spent [unfilled] minutes of time on the encounter.

## 2023-05-28 NOTE — PROGRESS NOTE ADULT - SUBJECTIVE AND OBJECTIVE BOX
condition stable  no sob at rest  ?low grade temp    MEDICATIONS  (STANDING):  azithromycin  IVPB      azithromycin  IVPB 500 milliGRAM(s) IV Intermittent every 24 hours  cefTRIAXone   IVPB 1000 milliGRAM(s) IV Intermittent every 24 hours  lactated ringers. 1000 milliLiter(s) (75 mL/Hr) IV Continuous <Continuous>  nicotine -  14 mG/24Hr(s) Patch 1 patch Transdermal daily    MEDICATIONS  (PRN):  acetaminophen    Suspension .. 650 milliGRAM(s) Oral every 6 hours PRN Mild Pain (1 - 3)  ALBUTerol    90 MICROgram(s) HFA Inhaler 2 Puff(s) Inhalation every 6 hours PRN Shortness of Breath and/or Wheezing  guaiFENesin Oral Liquid (Sugar-Free) 100 milliGRAM(s) Oral every 6 hours PRN Cough  oxycodone    5 mG/acetaminophen 325 mG 1 Tablet(s) Oral every 8 hours PRN Moderate Pain (4 - 6)      Allergies    No Known Allergies    Intolerances        Vital Signs Last 24 Hrs  T(C): 36.4 (28 Jun 2021 05:15), Max: 36.7 (27 Jun 2021 20:52)  T(F): 97.6 (28 Jun 2021 05:15), Max: 98.1 (27 Jun 2021 20:52)  HR: 97 (28 Jun 2021 05:15) (82 - 97)  BP: 141/92 (28 Jun 2021 05:15) (138/83 - 149/98)  BP(mean): --  RR: 20 (28 Jun 2021 05:15) (18 - 20)  SpO2: 96% (28 Jun 2021 05:15) (95% - 98%)    PHYSICAL EXAM  General: adult in NAD  HEENT: clear oropharynx, anicteric sclera, pink conjunctiva  Neck: supple  CV: normal S1/S2 with no murmur rubs or gallops  Lungs: positive air movement b/l ant lungs,clear to auscultation, no wheezes, no rales  Abdomen: soft non-tender non-distended, no hepatosplenomegaly  Ext: no clubbing cyanosis or edema  Skin: no rashes and no petechiae  Neuro: alert and oriented X 4, no focal deficits  LABS:                          14.3   8.67  )-----------( 498      ( 28 Jun 2021 08:08 )             41.5         Mean Cell Volume : 88.5 fl  Mean Cell Hemoglobin : 30.5 pg  Mean Cell Hemoglobin Concentration : 34.5 gm/dL  Auto Neutrophil # : x  Auto Lymphocyte # : x  Auto Monocyte # : x  Auto Eosinophil # : x  Auto Basophil # : x  Auto Neutrophil % : x  Auto Lymphocyte % : x  Auto Monocyte % : x  Auto Eosinophil % : x  Auto Basophil % : x    Serial CBC  Hematocrit 41.5  Hemoglobin 14.3  Plat 498  RBC 4.69  WBC 8.67  Serial CBC  Hematocrit 42.0  Hemoglobin 14.1  Plat 509  RBC 4.71  WBC 8.84  Serial CBC  Hematocrit 40.3  Hemoglobin 13.6  Plat 233  RBC 4.54  WBC 9.39  Serial CBC  Hematocrit 43.8  Hemoglobin 14.6  Plat 453  RBC 4.87  WBC 9.34    06-28    134<L>  |  101  |  14  ----------------------------<  88  4.1   |  21<L>  |  0.87    Ca    8.6      28 Jun 2021 08:08  Phos  2.7     06-28  Mg     2.5     06-28                      BLOOD SMEAR INTERPRETATION:       RADIOLOGY & ADDITIONAL STUDIES:     Alana/Raffaele

## 2023-07-01 NOTE — CHART NOTE - NSCHARTNOTEFT_GEN_A_CORE
EVENT: Received pt from PACU at 7pm, no complaints voiced    BRIEF HPI: 67 y/o male with a PMH of HTN, HLD, smoker  presents to the ED with c/o SOB, cough and chest tightness for the past 2 weeks. Also reported  night sweats and chills for one week. CT Chest w/ IV Cont Large left pleural effusion with associated atelectasis of the left lung as described above. Associated right shift of the mediastinum. Mildly enlarged subcarinal lymph node. Admitted for pleural effusion ro malignancy, ro TB. S/p VATS, with pleural biopsy POD #0.    OBJECTIVE:  Vital Signs Last 24 Hrs  T(C): 36.2 (28 Jun 2021 19:25), Max: 37.1 (28 Jun 2021 14:12)  T(F): 97.2 (28 Jun 2021 19:25), Max: 98.8 (28 Jun 2021 14:12)  HR: 86 (28 Jun 2021 19:25) (77 - 97)  BP: 132/86 (28 Jun 2021 19:25) (99/75 - 156/94)  BP(mean): 93 (28 Jun 2021 19:00) (80 - 98)  RR: 20 (28 Jun 2021 19:25) (15 - 20)  SpO2: 96% (28 Jun 2021 19:25) (95% - 100%)    FOCUSED PHYSICAL EXAM:  NEURO: Alert, oriented X 3  RESP: Even, unlabored. Left pigtail to pleura vac ( serosanguinous output)  CV: S1 S2    LABS:                        14.3   8.67  )-----------( 498      ( 28 Jun 2021 08:08 )             41.5     06-28    134<L>  |  101  |  14  ----------------------------<  88  4.1   |  21<L>  |  0.87    Ca    8.6      28 Jun 2021 08:08  Phos  2.7     06-28  Mg     2.5     06-28    PLAN:   1. Cont ceftriaxone   IVPB 1000 cris GRAM(s) IV Intermittent every 24 hours  2. Cont nicotine -  14 mG/24Hr(s) Patch 1 patch Transdermal daily  3. Cont acetaminophen    Suspension .. 650 cris GRAM(s) Oral every 6 hours PRN Mild Pain (1 - 3)  4. Cont Albuterol    90 MICRO gram(s) HFA Inhaler 2 Puff(s) Inhalation every 6 hours PRN Shortness of Breath and/or Wheezing  5. Cont guaifenesin Oral Liquid (Sugar-Free) 100 cris GRAM(s) Oral every 6 hours PRN Cough  6. Cont oxycodone    5 mG/acetaminophen 325 mG 1 Tablet(s) Oral every 8 hours PRN Moderate Pain (4 - 6)    FOLLOW UP / RESULT: pleural biopsy results
Thoracic Surgery update:    AM CXR reviewed. Discussion had with Thoracic Attending, and tube connected to -10mmHg suction. Will repeat CXR in 4 hours.    -maintain -10mmHg suction  -repeat CXR at 2 pm  -will f/u results with thoracic attending
Thoracic Surgery update:    Follow up CXR from this afternoon was followed up and discussed with attending, who recommended maintaining PleurX catheter on suction until tomorrow, at which point capping the catheter will be considered, as patient is currently in no acute respiratory distress and is saturating well on room air.    Was informed by nursing later in the evening that tubing for catheter appeared to be clotted off, and was no longer draining. Tubing was removed with noticeable clot, and replaced with 3-way connector, and now appears to be draining normally. Catheter maintained on suction.    Vitals: 97.0 temp, 87 hr, 112/82 bp, 16 RR, 96% o2 sat on RA  General: NAD  Cardio: RRR  Resp: nonlabored breathing, saturating well on RA, pleurX catheter in place, draining serous fluid, catheter on suction at -10 mmHg    -will f/u output of catheter in AM  -maintain suction at -10 mmHg  -surgery will continue to follow while in house  -OOBTC, incentive spirometry
Spoke with TS resident dr. Borrero regarding plan for drain outpatient. As per dr. Borrero, dr. Jarrett has plan for follow up outpt and remove pleurx cath but surgery PA recommended drain 3 x week.   Dr. Borrero will call this writer tomorrow AM for clear d/c plan. will f/u tomorrow.
Thoracic Surgery update:    Afternoon CXR reviewed with thoracic attending, decision made to cap the pleurx catheter. Catheter capped and dressing changed at bedside.    -Please obtain CXR in the morning
For information on Fall & Injury Prevention, visit: [email or web address]  [email or web address] OR  https://www.cdc.gov/steadi/patient.html

## 2023-07-11 ENCOUNTER — APPOINTMENT (OUTPATIENT)
Dept: PULMONOLOGY | Facility: CLINIC | Age: 68
End: 2023-07-11
Payer: MEDICARE

## 2023-07-11 VITALS
OXYGEN SATURATION: 98 % | BODY MASS INDEX: 27.31 KG/M2 | SYSTOLIC BLOOD PRESSURE: 155 MMHG | HEART RATE: 57 BPM | WEIGHT: 174 LBS | DIASTOLIC BLOOD PRESSURE: 89 MMHG | HEIGHT: 67 IN

## 2023-07-11 DIAGNOSIS — R05.9 COUGH, UNSPECIFIED: ICD-10-CM

## 2023-07-11 DIAGNOSIS — R05.8 OTHER SPECIFIED COUGH: ICD-10-CM

## 2023-07-11 DIAGNOSIS — K21.9 GASTRO-ESOPHAGEAL REFLUX DISEASE W/OUT ESOPHAGITIS: ICD-10-CM

## 2023-07-11 PROCEDURE — 99214 OFFICE O/P EST MOD 30 MIN: CPT | Mod: 25

## 2023-07-11 PROCEDURE — 94010 BREATHING CAPACITY TEST: CPT

## 2023-07-11 PROCEDURE — 94726 PLETHYSMOGRAPHY LUNG VOLUMES: CPT

## 2023-07-11 PROCEDURE — ZZZZZ: CPT

## 2023-07-12 PROBLEM — R05.9 COUGH: Status: ACTIVE | Noted: 2023-05-16

## 2023-07-12 PROBLEM — R05.8 UPPER AIRWAY COUGH SYNDROME: Status: ACTIVE | Noted: 2023-07-12

## 2023-07-12 PROBLEM — K21.9 GERD (GASTROESOPHAGEAL REFLUX DISEASE): Status: ACTIVE | Noted: 2021-08-02

## 2023-07-12 NOTE — REVIEW OF SYSTEMS
[Fever] : no fever [Fatigue] : fatigue [Recent Wt Gain (___ Lbs)] : ~T no recent weight gain [Chills] : no chills [Poor Appetite] : no poor appetite [Recent Wt Loss (___ Lbs)] : ~T no recent weight loss [Dry Eyes] : no dry eyes [Ear Disturbance] : no ear disturbance [Epistaxis] : no epistaxis [Eye Irritation] : no eye irritation [Nasal Congestion] : nasal congestion [Postnasal Drip] : no postnasal drip [Sinus Problems] : no sinus problems [Mouth Ulcers] : no mouth ulcers [Poor Dentition] : no poor dentition [Cough] : cough [Hemoptysis] : no hemoptysis [Chest Tightness] : chest tightness [Frequent URIs] : no frequent URIs [Sputum] : sputum [Dyspnea] : dyspnea [Pleuritic Pain] : pleuritic pain [Wheezing] : no wheezing [A.M. Dry Mouth] : no a.m. dry mouth [SOB on Exertion] : sob on exertion [Chest Discomfort] : no chest discomfort [Claudication] : no claudication [Edema] : no edema [Leg Cramps] : no leg cramps [Orthopnea] : no orthopnea [Palpitations] : no palpitations [Phlebitis] : no phlebitis [PND] : no PND [Syncope] : no syncope [Hay Fever] : no hay fever [Watery Eyes] : no watery eyes [Itchy Eyes] : no itchy eyes [Seasonal Allergies] : no seasonal allergies [Nasal Discharge] : no nasal discharge [Hives] : no hives [Angioedema] : no angioedema [Immunocompromised] : not immunocompromised [GERD] : no gerd [Abdominal Pain] : no abdominal pain [Nausea] : no nausea [Vomiting] : no vomiting [Diarrhea] : no diarrhea [Constipation] : no constipation [Dysphagia] : no dysphagia [Bleeding] : no bleeding [Food Intolerance] : no food intolerance [Hepatic Disease] : no hepatic disease [Nocturia] : no nocturia [Frequency] : no dysuria [Urgency] : no frequency [Dysuria] : no urgency [Arthralgias] : no arthralgias [Myalgias] : no myalgias [Back Pain] : no back pain [Fracture] : no fracture [Trauma/ Injury] : no trauma/ injury [Chronic Pain] : no chronic pain [Rash] : no rash [Ulcerations] : no ulcerations [Itch] : no itch [Anemia] : no anemia [Blood Transfusion] : no blood transfusion [Easy Bruising] : no easy bruising [History of Iron Deficiency] : no history of iron deficiency [Clotting Disorder/ Frequent bleeding] : no clotting disorder/ frequent bleeding [Headache] : no headache [Focal Weakness] : no focal weakness [Seizures] : no seizures [Head Injury] : no head injury [Dizziness] : no dizziness [Numbness] : no numbness [Memory Loss] : no memory loss [Tremor] : no tremor [Paralysis] : no paralysis [Confusion] : no confusion [Involuntary Movements] : no involuntary movements [Depression] : no depression [Anxiety] : no anxiety [Panic Attacks] : no panic attacks [Diabetes] : no diabetes [Thyroid Problem] : no thyroid problem [Obesity] : no obesity

## 2023-07-12 NOTE — ASSESSMENT
[FreeTextEntry1] : 66 M with left pleural effusion.\par VATS pleural bx with extensive necrotizing granulomatous inflammation.\par S/p pleurX\par Quant negative x2 which can be with acute infection\par AFB and fungal stains negative, cultures not sent\par FRAN x1 positive\par Pt is from [place]. He had some weight loss and low grade fevers and sweats.\par No evidence of another alternate etiology, negative for malignancy.\par \par CXR and clinical response to empiric TB pleuritis tx is very good \par Had generalized skin rash concerning for Pyrazinamide/ Ethambutol - Meds stopped, now resolved\par Loss of visual acuity - likely 2/2 to Ethambutol as well - now resolved\par numbness of fingers - ?INH, mild, cont Vit B6 at higher dose 50mg - meds stopped\par \par Serologies normal\par completed 6 mo of 2 drug therapy\par Reimaging reviewed, improvement in adenopathy AND PLEURAL THICKENING, decreased pulmonary nodule\par \par \par Plan:\par Suspect seasonal allergies exacerbating chronic cough - cont otc antihistamine qd\par c/w ppi for GERD\par CT chest Images reviewed, unchanged, awaiting official report\par PFTs normal

## 2024-08-30 NOTE — ASSESSMENT
Goal Outcome Evaluation:              Outcome Evaluation: Patient remains in CICU. Fem and radial sites clean, dry, intact, and soft. Mild to moderate pain treated with PRN meds. VSS. Continue to monitor.                                [FreeTextEntry1] : 66M with left pleural effusion.\par VATS pleural bx with extensive necrotizing granulomatous inflammation.\par Quant negative which can be with acute infection\par AFB and fungal stains negative but cultures pending\par Pt is from [place]. He had some weight loss and low grade fevers and sweats.\par No evidence of another alternate etiology, negative for malignancy.\par \par At this time, would treat for TB pleuritis \par Tissue bx was not sent for culture. bronchial wash negative for AFB and no growth.\par Sputum FRAN x1 in broth.\par \par Pleurx removed \par Overall clinically improved. CXR/CT improved\par Rash is concerning, could be Pyrazinamide or Ethambutol - meds stopped, now resolved\par Loss of visual acuity - likely Ethambutol, discontinued 2 weeks ago. Recommend ophtho eval. Rarely INH, will follow closely.\par Serologies normal\par Discussed at length risks and benefits of TB therapy, pt and daughter in agreement\par Ophtho referral\par

## 2024-12-05 ENCOUNTER — APPOINTMENT (OUTPATIENT)
Dept: PULMONOLOGY | Facility: CLINIC | Age: 69
End: 2024-12-05

## 2024-12-05 ENCOUNTER — APPOINTMENT (OUTPATIENT)
Dept: PULMONOLOGY | Facility: CLINIC | Age: 69
End: 2024-12-05
Payer: MEDICARE

## 2024-12-05 VITALS
BODY MASS INDEX: 27.47 KG/M2 | SYSTOLIC BLOOD PRESSURE: 166 MMHG | WEIGHT: 175 LBS | HEART RATE: 82 BPM | OXYGEN SATURATION: 97 % | RESPIRATION RATE: 16 BRPM | TEMPERATURE: 98.2 F | DIASTOLIC BLOOD PRESSURE: 89 MMHG | HEIGHT: 67 IN

## 2024-12-05 DIAGNOSIS — J43.2 CENTRILOBULAR EMPHYSEMA: ICD-10-CM

## 2024-12-05 DIAGNOSIS — K21.9 GASTRO-ESOPHAGEAL REFLUX DISEASE W/OUT ESOPHAGITIS: ICD-10-CM

## 2024-12-05 DIAGNOSIS — Z00.00 ENCOUNTER FOR GENERAL ADULT MEDICAL EXAMINATION W/OUT ABNORMAL FINDINGS: ICD-10-CM

## 2024-12-05 DIAGNOSIS — R05.8 OTHER SPECIFIED COUGH: ICD-10-CM

## 2024-12-05 PROCEDURE — 94726 PLETHYSMOGRAPHY LUNG VOLUMES: CPT

## 2024-12-05 PROCEDURE — 94729 DIFFUSING CAPACITY: CPT

## 2024-12-05 PROCEDURE — 99214 OFFICE O/P EST MOD 30 MIN: CPT | Mod: 25

## 2024-12-05 PROCEDURE — 94010 BREATHING CAPACITY TEST: CPT

## 2024-12-05 PROCEDURE — ZZZZZ: CPT

## 2024-12-05 RX ORDER — TIOTROPIUM BROMIDE INHALATION SPRAY 3.12 UG/1
2.5 SPRAY, METERED RESPIRATORY (INHALATION)
Qty: 1 | Refills: 5 | Status: ACTIVE | COMMUNITY
Start: 2024-12-05 | End: 1900-01-01

## 2024-12-05 RX ORDER — AZELASTINE HYDROCHLORIDE 137 UG/1
0.1 SPRAY, METERED NASAL TWICE DAILY
Qty: 90 | Refills: 1 | Status: ACTIVE | COMMUNITY
Start: 2024-12-05 | End: 1900-01-01

## 2024-12-15 NOTE — HISTORY OF PRESENT ILLNESS
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 He was initially diagnosed after presenting to [facility] with a large left pleural effusion, and underwent VATS with pleurx catheter placement. Pathology revealed necrotizing granulomas, but pleural fluid unrevealing. Since discharge his effusion has been drained x 2. He was unable to tolerate full RIPE regimen and was transitioned to only INH and Rifampin after 2 months. 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 Quit 1 month ago. \par Some OSBORN for many years, can climb stairs, walk many blocks.\par \par 8/2/21\par Pt here for follow up\par Reports multiple side effects due to medications. Rash over chest abdomen and shoulders, significant itching. Appears to be worse after taking ethambutol and pyrazinamide and he has been taking half the dose the last few days with improved side effects. Reports evening hot flashes, constipation, dry mouth and poor appetite due to bitter taste in mouth. No visual changes, no neuropathy, no abdominal pain.\par Dry cough, persistent, no phlegm.\par No night sweats, no sob, walks more, feels better overall.\par \par 9/20/21\par Pt presents for further follow up while on maintenance TB therapy. Endorses minor left sided pleuritic chest pain, stable non productive cough, some peripheral neuropathy and loss of taste from medicines. He denies weight loss, abdominal pain, jaundice, N/V/D, or worsening visual symptoms. He has follow up with an Ophthalmologist and scheduled for cataract sx in the near future. Otherwise tolerating medication well. CXR showing improvement. \par \par 11/2/21\par Doing well, tolerating meds.\par reports some itching of the palms of his hands and numbness in fingers for past 1 month.\par Vision issues have resolved and back to normal\par No cough, left chest discomfort improved.\par \par 6/28/2022\par Here for f/u 6mo after completion of therapy.\par Doing well, OSBORN at baseline\par Reports cough with some thick phlegm, bia when laying down\par Reports fleeting chest and upper back pains, intermittent, atypical, nonexertional\par \par 8/16/2022\par Pt reports chronic dry cough\par minimal phlegm\par atypical cp as above\par his ET has improved overall. \par \par 5/16/2023\par Pt returning for worsening dry cough with L scapular area pain, and L sided pleuritic chest pain with occassional white/yellow phlegm about 1month prior. Chest pain unrelated to exertion. He saw his pcp (Dr. Ariela Pandya) did an xray with L pleural effusion then had CT chest done. Cough increased since last visit. Pt with sinus congestion, red eyes.
Calm

## 2025-01-03 RX ORDER — UMECLIDINIUM 62.5 UG/1
62.5 AEROSOL, POWDER ORAL DAILY
Qty: 1 | Refills: 5 | Status: ACTIVE | COMMUNITY
Start: 2025-01-03 | End: 1900-01-01

## 2025-03-05 ENCOUNTER — APPOINTMENT (OUTPATIENT)
Dept: PULMONOLOGY | Facility: CLINIC | Age: 70
End: 2025-03-05
Payer: MEDICARE

## 2025-03-05 DIAGNOSIS — K21.9 GASTRO-ESOPHAGEAL REFLUX DISEASE W/OUT ESOPHAGITIS: ICD-10-CM

## 2025-03-05 DIAGNOSIS — J43.2 CENTRILOBULAR EMPHYSEMA: ICD-10-CM

## 2025-03-05 DIAGNOSIS — R05.8 OTHER SPECIFIED COUGH: ICD-10-CM

## 2025-03-05 PROCEDURE — 99214 OFFICE O/P EST MOD 30 MIN: CPT

## 2025-03-05 PROCEDURE — G2211 COMPLEX E/M VISIT ADD ON: CPT

## 2025-09-10 ENCOUNTER — APPOINTMENT (OUTPATIENT)
Dept: PULMONOLOGY | Facility: CLINIC | Age: 70
End: 2025-09-10
Payer: MEDICARE

## 2025-09-10 VITALS
RESPIRATION RATE: 16 BRPM | SYSTOLIC BLOOD PRESSURE: 151 MMHG | HEIGHT: 67 IN | HEART RATE: 60 BPM | WEIGHT: 172 LBS | DIASTOLIC BLOOD PRESSURE: 82 MMHG | OXYGEN SATURATION: 97 % | TEMPERATURE: 97.7 F | BODY MASS INDEX: 27 KG/M2

## 2025-09-10 DIAGNOSIS — R05.8 OTHER SPECIFIED COUGH: ICD-10-CM

## 2025-09-10 DIAGNOSIS — K21.9 GASTRO-ESOPHAGEAL REFLUX DISEASE W/OUT ESOPHAGITIS: ICD-10-CM

## 2025-09-10 DIAGNOSIS — Z87.891 PERSONAL HISTORY OF NICOTINE DEPENDENCE: ICD-10-CM

## 2025-09-10 DIAGNOSIS — J43.2 CENTRILOBULAR EMPHYSEMA: ICD-10-CM

## 2025-09-10 PROCEDURE — 99214 OFFICE O/P EST MOD 30 MIN: CPT

## 2025-09-10 PROCEDURE — G0296 VISIT TO DETERM LDCT ELIG: CPT
